# Patient Record
Sex: FEMALE | Race: WHITE | NOT HISPANIC OR LATINO | Employment: OTHER | URBAN - METROPOLITAN AREA
[De-identification: names, ages, dates, MRNs, and addresses within clinical notes are randomized per-mention and may not be internally consistent; named-entity substitution may affect disease eponyms.]

---

## 2023-04-17 DIAGNOSIS — B35.4 TINEA CORPORIS: Primary | ICD-10-CM

## 2023-04-18 RX ORDER — PANTOPRAZOLE SODIUM 40 MG/1
1 TABLET, DELAYED RELEASE ORAL DAILY
COMMUNITY
End: 2023-08-28 | Stop reason: SDUPTHER

## 2023-04-18 RX ORDER — SULFAMETHOXAZOLE AND TRIMETHOPRIM 800; 160 MG/1; MG/1
1 TABLET ORAL
COMMUNITY
Start: 2022-10-11 | End: 2024-02-29 | Stop reason: WASHOUT

## 2023-04-18 RX ORDER — KETOCONAZOLE 20 MG/G
CREAM TOPICAL
Qty: 45 G | Refills: 0 | Status: SHIPPED | OUTPATIENT
Start: 2023-04-18 | End: 2023-06-30 | Stop reason: ALTCHOICE

## 2023-04-18 RX ORDER — ATORVASTATIN CALCIUM 80 MG/1
1 TABLET, FILM COATED ORAL NIGHTLY
COMMUNITY
Start: 2019-02-13 | End: 2023-08-28 | Stop reason: SINTOL

## 2023-04-18 RX ORDER — LEVOTHYROXINE SODIUM 50 UG/1
1 TABLET ORAL DAILY
COMMUNITY
End: 2023-08-28 | Stop reason: SDUPTHER

## 2023-04-18 RX ORDER — ROPINIROLE 0.5 MG/1
TABLET, FILM COATED ORAL
COMMUNITY
Start: 2015-09-10 | End: 2023-08-28 | Stop reason: SDUPTHER

## 2023-04-18 RX ORDER — SPIRONOLACTONE 50 MG/1
TABLET, FILM COATED ORAL
COMMUNITY
Start: 2022-06-14 | End: 2024-02-29 | Stop reason: WASHOUT

## 2023-04-18 RX ORDER — LYSINE HCL 500 MG
1 TABLET ORAL DAILY
COMMUNITY

## 2023-04-18 RX ORDER — CARVEDILOL 12.5 MG/1
TABLET ORAL 2 TIMES DAILY
COMMUNITY
Start: 2015-09-10 | End: 2023-08-28 | Stop reason: SDUPTHER

## 2023-04-18 RX ORDER — ASPIRIN 81 MG/1
1 TABLET ORAL DAILY
COMMUNITY
End: 2023-08-28 | Stop reason: SDUPTHER

## 2023-04-18 RX ORDER — VIT C/E/ZN/COPPR/LUTEIN/ZEAXAN 250MG-90MG
1 CAPSULE ORAL DAILY
COMMUNITY
Start: 2021-04-21

## 2023-06-30 ENCOUNTER — OFFICE VISIT (OUTPATIENT)
Dept: PRIMARY CARE | Facility: CLINIC | Age: 83
End: 2023-06-30
Payer: MEDICARE

## 2023-06-30 VITALS
HEIGHT: 62 IN | BODY MASS INDEX: 31.65 KG/M2 | TEMPERATURE: 97 F | OXYGEN SATURATION: 97 % | WEIGHT: 172 LBS | HEART RATE: 60 BPM | DIASTOLIC BLOOD PRESSURE: 72 MMHG | RESPIRATION RATE: 16 BRPM | SYSTOLIC BLOOD PRESSURE: 111 MMHG

## 2023-06-30 DIAGNOSIS — M12.811 ROTATOR CUFF TEAR ARTHROPATHY OF RIGHT SHOULDER: ICD-10-CM

## 2023-06-30 DIAGNOSIS — E78.2 MIXED HYPERLIPIDEMIA: ICD-10-CM

## 2023-06-30 DIAGNOSIS — M75.21 BICEPS TENDONITIS ON RIGHT: ICD-10-CM

## 2023-06-30 DIAGNOSIS — E03.9 ACQUIRED HYPOTHYROIDISM: ICD-10-CM

## 2023-06-30 DIAGNOSIS — E55.9 VITAMIN D INSUFFICIENCY: ICD-10-CM

## 2023-06-30 DIAGNOSIS — I10 ESSENTIAL HYPERTENSION: Primary | ICD-10-CM

## 2023-06-30 DIAGNOSIS — M75.101 ROTATOR CUFF TEAR ARTHROPATHY OF RIGHT SHOULDER: ICD-10-CM

## 2023-06-30 DIAGNOSIS — L81.9 PIGMENTED SKIN LESION OF UNCERTAIN BEHAVIOR OF NECK: ICD-10-CM

## 2023-06-30 PROBLEM — M19.90 OSTEOARTHROSIS: Status: ACTIVE | Noted: 2023-06-30

## 2023-06-30 PROBLEM — L30.9 DERMATITIS: Status: ACTIVE | Noted: 2023-06-30

## 2023-06-30 PROBLEM — R07.81 RIB PAIN ON RIGHT SIDE: Status: ACTIVE | Noted: 2023-06-30

## 2023-06-30 PROBLEM — J06.9 ACUTE URI: Status: ACTIVE | Noted: 2023-06-30

## 2023-06-30 PROBLEM — M25.552 ACUTE PAIN OF BOTH HIPS: Status: ACTIVE | Noted: 2023-06-30

## 2023-06-30 PROBLEM — Q23.81 BICUSPID AORTIC VALVE: Status: ACTIVE | Noted: 2023-06-30

## 2023-06-30 PROBLEM — Z95.2 PRESENCE OF PROSTHETIC HEART VALVE: Status: ACTIVE | Noted: 2023-06-30

## 2023-06-30 PROBLEM — I71.00 AORTIC DISSECTION (MULTI): Status: ACTIVE | Noted: 2023-06-30

## 2023-06-30 PROBLEM — M85.80 OSTEOPENIA: Status: ACTIVE | Noted: 2023-06-30

## 2023-06-30 PROBLEM — M54.2 NECK PAIN: Status: ACTIVE | Noted: 2023-06-30

## 2023-06-30 PROBLEM — E78.5 HYPERLIPIDEMIA: Status: ACTIVE | Noted: 2023-06-30

## 2023-06-30 PROBLEM — I71.22: Status: ACTIVE | Noted: 2023-06-30

## 2023-06-30 PROBLEM — M48.02 SPINAL STENOSIS OF CERVICAL REGION: Status: ACTIVE | Noted: 2023-06-30

## 2023-06-30 PROBLEM — J20.9 ACUTE BRONCHITIS: Status: ACTIVE | Noted: 2023-06-30

## 2023-06-30 PROBLEM — M54.50 LOW BACK PAIN, EPISODIC: Status: ACTIVE | Noted: 2023-06-30

## 2023-06-30 PROBLEM — J01.00 MAXILLARY SINUSITIS, ACUTE: Status: ACTIVE | Noted: 2023-06-30

## 2023-06-30 PROBLEM — R30.0 DYSURIA: Status: ACTIVE | Noted: 2023-06-30

## 2023-06-30 PROBLEM — Q23.1 BICUSPID AORTIC VALVE (HHS-HCC): Status: ACTIVE | Noted: 2023-06-30

## 2023-06-30 PROBLEM — M25.551 ACUTE PAIN OF BOTH HIPS: Status: ACTIVE | Noted: 2023-06-30

## 2023-06-30 PROBLEM — I49.9 IRREGULAR HEART RHYTHM: Status: ACTIVE | Noted: 2023-06-30

## 2023-06-30 PROBLEM — K63.5 COLON POLYPS: Status: ACTIVE | Noted: 2023-06-30

## 2023-06-30 PROBLEM — I48.0 PAROXYSMAL ATRIAL FIBRILLATION (MULTI): Status: ACTIVE | Noted: 2023-06-30

## 2023-06-30 PROCEDURE — 1036F TOBACCO NON-USER: CPT | Performed by: INTERNAL MEDICINE

## 2023-06-30 PROCEDURE — 1159F MED LIST DOCD IN RCRD: CPT | Performed by: INTERNAL MEDICINE

## 2023-06-30 PROCEDURE — 3078F DIAST BP <80 MM HG: CPT | Performed by: INTERNAL MEDICINE

## 2023-06-30 PROCEDURE — 3074F SYST BP LT 130 MM HG: CPT | Performed by: INTERNAL MEDICINE

## 2023-06-30 PROCEDURE — 99214 OFFICE O/P EST MOD 30 MIN: CPT | Performed by: INTERNAL MEDICINE

## 2023-06-30 RX ORDER — METHYLPREDNISOLONE 4 MG/1
TABLET ORAL
Qty: 21 TABLET | Refills: 0 | Status: SHIPPED | OUTPATIENT
Start: 2023-06-30 | End: 2023-07-07

## 2023-06-30 SDOH — ECONOMIC STABILITY: FOOD INSECURITY: WITHIN THE PAST 12 MONTHS, THE FOOD YOU BOUGHT JUST DIDN'T LAST AND YOU DIDN'T HAVE MONEY TO GET MORE.: NEVER TRUE

## 2023-06-30 SDOH — ECONOMIC STABILITY: FOOD INSECURITY: WITHIN THE PAST 12 MONTHS, YOU WORRIED THAT YOUR FOOD WOULD RUN OUT BEFORE YOU GOT MONEY TO BUY MORE.: NEVER TRUE

## 2023-06-30 ASSESSMENT — ENCOUNTER SYMPTOMS
OCCASIONAL FEELINGS OF UNSTEADINESS: 1
DEPRESSION: 0
LOSS OF SENSATION IN FEET: 0

## 2023-06-30 ASSESSMENT — LIFESTYLE VARIABLES
HOW OFTEN DO YOU HAVE SIX OR MORE DRINKS ON ONE OCCASION: NEVER
HOW OFTEN DO YOU HAVE A DRINK CONTAINING ALCOHOL: MONTHLY OR LESS
AUDIT-C TOTAL SCORE: 1
HOW MANY STANDARD DRINKS CONTAINING ALCOHOL DO YOU HAVE ON A TYPICAL DAY: 1 OR 2
SKIP TO QUESTIONS 9-10: 1

## 2023-06-30 ASSESSMENT — PATIENT HEALTH QUESTIONNAIRE - PHQ9
SUM OF ALL RESPONSES TO PHQ9 QUESTIONS 1 & 2: 0
2. FEELING DOWN, DEPRESSED OR HOPELESS: NOT AT ALL
1. LITTLE INTEREST OR PLEASURE IN DOING THINGS: NOT AT ALL

## 2023-06-30 NOTE — ASSESSMENT & PLAN NOTE
Patient likely has right bicep tendonitis, will avoid use of NSAIDS given cardiac history, trial of Medrol dose pack to see if this alleviates the acute symptoms, will check an x ray to see if the bicep is torn, hopefully this will be seen on x ray

## 2023-06-30 NOTE — PROGRESS NOTES
"Chief Complaint/HPI:      Follow up    paroxysmal atrial fib, history of bioprosthetic valve, history of aortic dissection: Patient is post repair, she still follows up with Dr Monterroso at [facility], feels well now, patient has lost some weight also     Right arm pain: patient c/o right arm pain for about a week, patient states that the pain is \"terrible\", rates the pain 6/10 on pain scale now, patient needs to use the left hand to  any heavy items, patient did a great deal of crocheting prior to noting the pain, she only uses acetaminophen for pain issue     osteopenia: patient sees Dr Vincent for routine follow up, last DEXA scan completed in 1/2021     colon polyps: patient had colonoscopy per Dr Diaz at [facility], no polyps noted, she requires no follow up exams     restless legs: patient takes ropinirole        hyperlipidemia. Current treatment includes atorvastatin 80 mg    essential hypertension. The patient states she has been doing well with her blood pressure control since the last visit: takes carvedilol.     hypothyroidism of undetermined etiology : takes Synthroid 50 mcg daily .     ROS otherwise negative aside from what was mentioned above in HPI.      Patient Active Problem List   Diagnosis    Aneurysm of aortic arch (CMS/HCC)    Acute URI    Acute pain of both hips    Acute bronchitis    Aortic dissection (CMS/HCC)    Bicuspid aortic valve    Colon polyps    Dermatitis    Dysuria    Essential hypertension    Maxillary sinusitis, acute    Low back pain, episodic    Irregular heart rhythm    Hypothyroidism    Osteoarthrosis    Neck pain    Mixed hyperlipidemia    Hyperlipidemia    Paroxysmal atrial fibrillation (CMS/HCC)    Osteopenia    Presence of prosthetic heart valve    Rib pain on right side    Spinal stenosis of cervical region    Vitamin D insufficiency    Biceps tendonitis on right    Pigmented skin lesion of uncertain behavior of neck         Past Medical History:   Diagnosis " Date    Benign neoplasm of cranial nerves (CMS/HCC)     Vestibular schwannoma    Diverticulitis of intestine, part unspecified, without perforation or abscess without bleeding     Diverticulitis    Diverticulosis of intestine, part unspecified, without perforation or abscess without bleeding     Diverticulosis    Epidermolysis bullosa, 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    Epidermolysis bullosa    Personal history of other diseases of the digestive system     History of esophageal reflux    Personal history of other diseases of the digestive system 11/05/2019    History of gastroesophageal reflux (GERD)    Personal history of other diseases of the musculoskeletal system and connective tissue     History of arthritis    Personal history of other diseases of the musculoskeletal system and connective tissue 11/06/2019    History of muscle spasm    Personal history of other diseases of the nervous system and sense organs 11/05/2019    History of restless legs syndrome    Personal history of other diseases of the nervous system and sense organs 03/03/2022    History of Guillain-Kildare syndrome    Personal history of other drug therapy 02/02/2020    History of influenza vaccination    Personal history of other specified conditions 11/20/2019    History of nasal congestion    Presence of xenogenic heart valve 08/25/2022    History of aortic valve replacement with bioprosthetic valve    Restless legs syndrome     Restless leg syndrome     Past Surgical History:   Procedure Laterality Date    AORTIC VALVE REPLACEMENT  08/04/2016    Aortic Valve Replacement    CATARACT EXTRACTION  02/15/2017    Cataract Surgery    CT ABDOMEN PELVIS ANGIOGRAM W AND/OR WO IV CONTRAST  10/5/2015    CT ABDOMEN PELVIS ANGIOGRAM W AND/OR WO IV CONTRAST 10/5/2015 [facility] [place] ANCILLARY LEGACY    HAND SURGERY  08/04/2016    Hand Surgery                                                                                                                                            "               HERNIA REPAIR  08/04/2016    Hernia Repair    HYSTERECTOMY  08/04/2016    Hysterectomy    IR CVC TUNNELED  9/3/2015    IR CVC TUNNELED 9/3/2015 [facility] CLINICAL LEGACY    OTHER SURGICAL HISTORY  08/04/2016    Asc Aortic Aneurysm Graft, Coronary Reconst, Root Replace     Social History     Social History Narrative    Not on file         ALLERGIES  Amoxicillin      MEDICATIONS  Current Outpatient Medications on File Prior to Visit   Medication Sig Dispense Refill    aspirin 81 mg EC tablet Take 1 tablet (81 mg) by mouth once daily.      atorvastatin (Lipitor) 80 mg tablet Take 1 tablet (80 mg) by mouth once daily at bedtime.      calcium carbonate-vit D3-min 600 mg calcium- 400 unit tablet Take 1 tablet by mouth once daily.      carvedilol (Coreg) 12.5 mg tablet Take by mouth twice a day.      cholecalciferol (Vitamin D-3) 25 MCG (1000 UT) capsule Take 1 capsule (25 mcg) by mouth once daily.      levothyroxine (Synthroid, Levoxyl) 50 mcg tablet Take 1 tablet (50 mcg) by mouth once daily.      pantoprazole (ProtoNix) 40 mg EC tablet Take 1 tablet (40 mg) by mouth once daily.      rOPINIRole (Requip) 0.5 mg tablet Take by mouth.      spironolactone (Aldactone) 50 mg tablet Take by mouth.      sulfamethoxazole-trimethoprim (Bactrim DS) 800-160 mg tablet Take 1 tablet by mouth 2 times a day.      [DISCONTINUED] ketoconazole (NIZOral) 2 % cream Apply to affected area once daily as needed (Patient not taking: Reported on 6/30/2023) 45 g 0     No current facility-administered medications on file prior to visit.         PHYSICAL EXAM  /72 (BP Location: Left arm, Patient Position: Sitting, BP Cuff Size: Large adult)   Pulse 60   Temp 36.1 °C (97 °F)   Resp 16   Ht 1.575 m (5' 2\")   Wt 78 kg (172 lb)   SpO2 97%   BMI 31.46 kg/m²   Body mass index is 31.46 kg/m².  Constitutional   General appearance:  well developed, well nourished, overweight  Eyes   Inspection of eyes: Sclera and conjunctiva were " normal.   Ears, Nose, Mouth, and Throat   Ears: Auricles: Normal. No carotid bruits, no thyromegaly or masses  Pulmonary   Respiratory assessment: No respiratory distress, normal respiratory rhythm and effort.    Auscultation of Lungs: Clear bilateral breath sounds.   Cardiovascular   Auscultation of heart:  The heart rate was normal. The rhythm was regular. Heart sounds: a click was heard, but normal S1 and normal S2. Prosthetic valve: prosthetic aortic valve heard. A grade 2 systolic murmur was heard at the LUSB. This has not changed, bioprosthetic valve, valvular click is noted.    Exam for edema: No peripheral edema.   Lymphatic   Palpation of lymph nodes in neck: No cervical lymphadenopathy.   Neurologic   Cranial nerves: Nerves 2-12 were intact, no focal neuro defects  MSK  Tender over right bicep tendon, just proximal to the right elbow. No abnormal contraction of bicep is appreciated, the right elbow is not tender     Psychiatric   Orientation: Oriented to person, place, and time.    Mood and affect: Normal.     ASSESSMENT/PLAN  Problem List Items Addressed This Visit       Essential hypertension - Primary    Current Assessment & Plan     BP is well controlled, no med changes needed         Hypothyroidism    Current Assessment & Plan     Stable, no med changes, recheck labs as ordered         Mixed hyperlipidemia    Current Assessment & Plan     Stable labs ,  no med changes, recheck labs as ordered         Vitamin D insufficiency    Biceps tendonitis on right    Current Assessment & Plan     Patient likely has right bicep tendonitis, will avoid use of NSAIDS given cardiac history, trial of Medrol dose pack to see if this alleviates the acute symptoms, will check an x ray to see if the bicep is torn, hopefully this will be seen on x ray         Relevant Medications    methylPREDNISolone (Medrol Dospak) 4 mg tablets    Other Relevant Orders    XR humerus right    Pigmented skin lesion of uncertain behavior of  neck    Relevant Orders    Referral to Dermatology   Check labs as ordered  Follow up as scheduled.          Miguel Angel Bal MD

## 2023-07-05 ENCOUNTER — TELEPHONE (OUTPATIENT)
Dept: PRIMARY CARE | Facility: CLINIC | Age: 83
End: 2023-07-05
Payer: MEDICARE

## 2023-07-05 DIAGNOSIS — M12.811 ROTATOR CUFF TEAR ARTHROPATHY OF RIGHT SHOULDER: Primary | ICD-10-CM

## 2023-07-05 DIAGNOSIS — M75.101 ROTATOR CUFF TEAR ARTHROPATHY OF RIGHT SHOULDER: Primary | ICD-10-CM

## 2023-07-06 PROBLEM — M12.811 ROTATOR CUFF TEAR ARTHROPATHY OF RIGHT SHOULDER: Status: ACTIVE | Noted: 2023-07-06

## 2023-07-06 PROBLEM — M75.101 ROTATOR CUFF TEAR ARTHROPATHY OF RIGHT SHOULDER: Status: ACTIVE | Noted: 2023-07-06

## 2023-07-06 NOTE — TELEPHONE ENCOUNTER
Pt called and needs to know what the results are of her xray. She isn't able to use her arm/hand, and she needs to know what's going on.

## 2023-07-13 NOTE — TELEPHONE ENCOUNTER
Gave pt results and she stated she is not having any more pain with it.  She is icing it twice a day and it got better.  Will discuss at future visit.

## 2023-07-24 NOTE — TELEPHONE ENCOUNTER
Pt stopped by the office wanting to know the result of her xray.  She remembers talking to Roslyn Lopez MA.  She says she is in pain and is able to lift her arm above her head and doesn't think it is with her rotator cuff.  She is wondering if she should have a CT or some other X-Ray.  I told her that Dr Bal would like to refer her to Ortho but she said she would like a more definite reason on why she should go there.

## 2023-07-24 NOTE — TELEPHONE ENCOUNTER
Pt called back in and would like another call about this. Patient is confused and does not remember what the results were. Patient notes that she is not home this morning but can take a call any other time.

## 2023-07-31 ENCOUNTER — TELEPHONE (OUTPATIENT)
Dept: PRIMARY CARE | Facility: CLINIC | Age: 83
End: 2023-07-31
Payer: MEDICARE

## 2023-08-03 NOTE — TELEPHONE ENCOUNTER
I see your review of her recent xray results but do not see anything in the system for her MRI. Please take a look when you have a chance and I will let her know.

## 2023-08-10 ENCOUNTER — TELEPHONE (OUTPATIENT)
Dept: PRIMARY CARE | Facility: CLINIC | Age: 83
End: 2023-08-10
Payer: MEDICARE

## 2023-08-10 DIAGNOSIS — M85.80 OSTEOPENIA, UNSPECIFIED LOCATION: ICD-10-CM

## 2023-08-10 DIAGNOSIS — I48.0 PAROXYSMAL ATRIAL FIBRILLATION (MULTI): ICD-10-CM

## 2023-08-10 DIAGNOSIS — E03.9 ACQUIRED HYPOTHYROIDISM: ICD-10-CM

## 2023-08-10 DIAGNOSIS — E78.2 MIXED HYPERLIPIDEMIA: Primary | ICD-10-CM

## 2023-08-10 DIAGNOSIS — E55.9 VITAMIN D INSUFFICIENCY: ICD-10-CM

## 2023-08-10 DIAGNOSIS — I10 ESSENTIAL HYPERTENSION: ICD-10-CM

## 2023-08-25 ENCOUNTER — LAB (OUTPATIENT)
Dept: LAB | Facility: LAB | Age: 83
End: 2023-08-25
Payer: MEDICARE

## 2023-08-25 DIAGNOSIS — E55.9 VITAMIN D INSUFFICIENCY: ICD-10-CM

## 2023-08-25 DIAGNOSIS — E03.9 ACQUIRED HYPOTHYROIDISM: ICD-10-CM

## 2023-08-25 DIAGNOSIS — I48.0 PAROXYSMAL ATRIAL FIBRILLATION (MULTI): ICD-10-CM

## 2023-08-25 DIAGNOSIS — I10 ESSENTIAL HYPERTENSION: ICD-10-CM

## 2023-08-25 DIAGNOSIS — E78.2 MIXED HYPERLIPIDEMIA: ICD-10-CM

## 2023-08-25 DIAGNOSIS — M85.80 OSTEOPENIA, UNSPECIFIED LOCATION: ICD-10-CM

## 2023-08-25 LAB
ALANINE AMINOTRANSFERASE (SGPT) (U/L) IN SER/PLAS: 13 U/L (ref 7–45)
ALBUMIN (G/DL) IN SER/PLAS: 4.2 G/DL (ref 3.4–5)
ALBUMIN (MG/L) IN URINE: <7 MG/L
ALBUMIN/CREATININE (UG/MG) IN URINE: NORMAL UG/MG CRT (ref 0–30)
ALKALINE PHOSPHATASE (U/L) IN SER/PLAS: 51 U/L (ref 33–136)
ANION GAP IN SER/PLAS: 12 MMOL/L (ref 10–20)
ASPARTATE AMINOTRANSFERASE (SGOT) (U/L) IN SER/PLAS: 19 U/L (ref 9–39)
BASOPHILS (10*3/UL) IN BLOOD BY AUTOMATED COUNT: 0.08 X10E9/L (ref 0–0.1)
BASOPHILS/100 LEUKOCYTES IN BLOOD BY AUTOMATED COUNT: 1.2 % (ref 0–2)
BILIRUBIN TOTAL (MG/DL) IN SER/PLAS: 0.8 MG/DL (ref 0–1.2)
CALCIDIOL (25 OH VITAMIN D3) (NG/ML) IN SER/PLAS: 28 NG/ML
CALCIUM (MG/DL) IN SER/PLAS: 9.1 MG/DL (ref 8.6–10.3)
CARBON DIOXIDE, TOTAL (MMOL/L) IN SER/PLAS: 24 MMOL/L (ref 21–32)
CHLORIDE (MMOL/L) IN SER/PLAS: 108 MMOL/L (ref 98–107)
CHOLESTEROL (MG/DL) IN SER/PLAS: 191 MG/DL (ref 0–199)
CHOLESTEROL IN HDL (MG/DL) IN SER/PLAS: 74.4 MG/DL
CHOLESTEROL/HDL RATIO: 2.6
CREATININE (MG/DL) IN SER/PLAS: 1.04 MG/DL (ref 0.5–1.05)
CREATININE (MG/DL) IN URINE: 99.5 MG/DL (ref 20–320)
EOSINOPHILS (10*3/UL) IN BLOOD BY AUTOMATED COUNT: 0.26 X10E9/L (ref 0–0.4)
EOSINOPHILS/100 LEUKOCYTES IN BLOOD BY AUTOMATED COUNT: 4.1 % (ref 0–6)
ERYTHROCYTE DISTRIBUTION WIDTH (RATIO) BY AUTOMATED COUNT: 14.2 % (ref 11.5–14.5)
ERYTHROCYTE MEAN CORPUSCULAR HEMOGLOBIN CONCENTRATION (G/DL) BY AUTOMATED: 32 G/DL (ref 32–36)
ERYTHROCYTE MEAN CORPUSCULAR VOLUME (FL) BY AUTOMATED COUNT: 103 FL (ref 80–100)
ERYTHROCYTES (10*6/UL) IN BLOOD BY AUTOMATED COUNT: 3.89 X10E12/L (ref 4–5.2)
GFR FEMALE: 53 ML/MIN/1.73M2
GLUCOSE (MG/DL) IN SER/PLAS: 114 MG/DL (ref 74–99)
HEMATOCRIT (%) IN BLOOD BY AUTOMATED COUNT: 40 % (ref 36–46)
HEMOGLOBIN (G/DL) IN BLOOD: 12.8 G/DL (ref 12–16)
IMMATURE GRANULOCYTES/100 LEUKOCYTES IN BLOOD BY AUTOMATED COUNT: 0.3 % (ref 0–0.9)
LDL: 94 MG/DL (ref 0–99)
LEUKOCYTES (10*3/UL) IN BLOOD BY AUTOMATED COUNT: 6.4 X10E9/L (ref 4.4–11.3)
LYMPHOCYTES (10*3/UL) IN BLOOD BY AUTOMATED COUNT: 1.62 X10E9/L (ref 0.8–3)
LYMPHOCYTES/100 LEUKOCYTES IN BLOOD BY AUTOMATED COUNT: 25.3 % (ref 13–44)
MONOCYTES (10*3/UL) IN BLOOD BY AUTOMATED COUNT: 0.77 X10E9/L (ref 0.05–0.8)
MONOCYTES/100 LEUKOCYTES IN BLOOD BY AUTOMATED COUNT: 12 % (ref 2–10)
NEUTROPHILS (10*3/UL) IN BLOOD BY AUTOMATED COUNT: 3.66 X10E9/L (ref 1.6–5.5)
NEUTROPHILS/100 LEUKOCYTES IN BLOOD BY AUTOMATED COUNT: 57.1 % (ref 40–80)
PLATELETS (10*3/UL) IN BLOOD AUTOMATED COUNT: 181 X10E9/L (ref 150–450)
POTASSIUM (MMOL/L) IN SER/PLAS: 4.6 MMOL/L (ref 3.5–5.3)
PROTEIN TOTAL: 6.4 G/DL (ref 6.4–8.2)
SODIUM (MMOL/L) IN SER/PLAS: 139 MMOL/L (ref 136–145)
TRIGLYCERIDE (MG/DL) IN SER/PLAS: 114 MG/DL (ref 0–149)
UREA NITROGEN (MG/DL) IN SER/PLAS: 19 MG/DL (ref 6–23)
VLDL: 23 MG/DL (ref 0–40)

## 2023-08-25 PROCEDURE — 85025 COMPLETE CBC W/AUTO DIFF WBC: CPT

## 2023-08-25 PROCEDURE — 80053 COMPREHEN METABOLIC PANEL: CPT

## 2023-08-25 PROCEDURE — 82570 ASSAY OF URINE CREATININE: CPT

## 2023-08-25 PROCEDURE — 82306 VITAMIN D 25 HYDROXY: CPT

## 2023-08-25 PROCEDURE — 36415 COLL VENOUS BLD VENIPUNCTURE: CPT

## 2023-08-25 PROCEDURE — 82043 UR ALBUMIN QUANTITATIVE: CPT

## 2023-08-25 PROCEDURE — 80061 LIPID PANEL: CPT

## 2023-08-28 ENCOUNTER — OFFICE VISIT (OUTPATIENT)
Dept: PRIMARY CARE | Facility: CLINIC | Age: 83
End: 2023-08-28
Payer: MEDICARE

## 2023-08-28 VITALS
HEIGHT: 63 IN | HEART RATE: 81 BPM | DIASTOLIC BLOOD PRESSURE: 62 MMHG | OXYGEN SATURATION: 95 % | RESPIRATION RATE: 16 BRPM | WEIGHT: 173 LBS | BODY MASS INDEX: 30.65 KG/M2 | SYSTOLIC BLOOD PRESSURE: 97 MMHG | TEMPERATURE: 97.4 F

## 2023-08-28 DIAGNOSIS — I10 ESSENTIAL HYPERTENSION: ICD-10-CM

## 2023-08-28 DIAGNOSIS — G25.81 RESTLESS LEG SYNDROME: ICD-10-CM

## 2023-08-28 DIAGNOSIS — Z95.2 PRESENCE OF PROSTHETIC HEART VALVE: ICD-10-CM

## 2023-08-28 DIAGNOSIS — E78.2 MIXED HYPERLIPIDEMIA: ICD-10-CM

## 2023-08-28 DIAGNOSIS — E55.9 VITAMIN D INSUFFICIENCY: ICD-10-CM

## 2023-08-28 DIAGNOSIS — E03.9 ACQUIRED HYPOTHYROIDISM: ICD-10-CM

## 2023-08-28 DIAGNOSIS — M54.2 NECK PAIN: ICD-10-CM

## 2023-08-28 DIAGNOSIS — E66.9 OBESITY (BMI 30.0-34.9): ICD-10-CM

## 2023-08-28 DIAGNOSIS — K21.9 GASTROESOPHAGEAL REFLUX DISEASE, UNSPECIFIED WHETHER ESOPHAGITIS PRESENT: Primary | ICD-10-CM

## 2023-08-28 PROBLEM — Z95.4: Status: ACTIVE | Noted: 2023-08-28

## 2023-08-28 PROBLEM — E66.811 OBESITY (BMI 30.0-34.9): Status: ACTIVE | Noted: 2021-07-01

## 2023-08-28 PROCEDURE — 1036F TOBACCO NON-USER: CPT | Performed by: INTERNAL MEDICINE

## 2023-08-28 PROCEDURE — 99214 OFFICE O/P EST MOD 30 MIN: CPT | Performed by: INTERNAL MEDICINE

## 2023-08-28 PROCEDURE — 3078F DIAST BP <80 MM HG: CPT | Performed by: INTERNAL MEDICINE

## 2023-08-28 PROCEDURE — 1159F MED LIST DOCD IN RCRD: CPT | Performed by: INTERNAL MEDICINE

## 2023-08-28 PROCEDURE — 3074F SYST BP LT 130 MM HG: CPT | Performed by: INTERNAL MEDICINE

## 2023-08-28 RX ORDER — ATORVASTATIN CALCIUM 80 MG/1
80 TABLET, FILM COATED ORAL NIGHTLY
Qty: 90 TABLET | Refills: 1 | Status: CANCELLED | OUTPATIENT
Start: 2023-08-28

## 2023-08-28 RX ORDER — CARVEDILOL 12.5 MG/1
12.5 TABLET ORAL
Qty: 180 TABLET | Refills: 3 | Status: SHIPPED | OUTPATIENT
Start: 2023-08-28 | End: 2024-02-29 | Stop reason: SDUPTHER

## 2023-08-28 RX ORDER — ROSUVASTATIN CALCIUM 40 MG/1
40 TABLET, COATED ORAL DAILY
Qty: 90 TABLET | Refills: 3 | Status: SHIPPED | OUTPATIENT
Start: 2023-08-28 | End: 2024-02-29 | Stop reason: SDUPTHER

## 2023-08-28 RX ORDER — LEVOTHYROXINE SODIUM 50 UG/1
50 TABLET ORAL DAILY
Qty: 90 TABLET | Refills: 1 | Status: SHIPPED | OUTPATIENT
Start: 2023-08-28 | End: 2024-02-29 | Stop reason: SDUPTHER

## 2023-08-28 RX ORDER — ROPINIROLE 0.5 MG/1
1.5 TABLET, FILM COATED ORAL NIGHTLY
Qty: 270 TABLET | Refills: 3 | Status: SHIPPED | OUTPATIENT
Start: 2023-08-28 | End: 2024-02-29 | Stop reason: SDUPTHER

## 2023-08-28 RX ORDER — PANTOPRAZOLE SODIUM 40 MG/1
40 TABLET, DELAYED RELEASE ORAL DAILY
Qty: 90 TABLET | Refills: 1 | Status: SHIPPED | OUTPATIENT
Start: 2023-08-28 | End: 2024-02-29 | Stop reason: SDUPTHER

## 2023-08-28 RX ORDER — ASPIRIN 81 MG/1
81 TABLET ORAL DAILY
Qty: 90 TABLET | Refills: 1 | Status: SHIPPED | OUTPATIENT
Start: 2023-08-28 | End: 2024-02-29 | Stop reason: SDUPTHER

## 2023-08-28 NOTE — ASSESSMENT & PLAN NOTE
Patient takes atorvastatin 80 mg  daily, will try rosuvastatin to see if this reduces muscle aching, stop the atorvastatin, recheck labs in 6 months

## 2023-08-28 NOTE — PROGRESS NOTES
Chief Complaint/HPI:    paroxysmal atrial fib, history of bioprosthetic valve, history of aortic dissection: Patient is post repair, she still follows up with Dr Monterroso at [facility], feels well now     Right arm pain: pain has resolved, patient stopped sewing, symptoms are gone now     osteopenia: patient sees Dr Vincent for routine follow up, last DEXA scan completed in 1/2021,  it is due this year     colon polyps: patient had colonoscopy per Dr Diaz at [facility], no polyps noted, she requires no follow up exams     restless legs: patient takes ropinirole    hyperlipidemia. Current treatment includes atorvastatin 80 mg, patient gets muscle aching, she feels that this may due to atorvastatin     essential hypertension. The patient states she has been doing well with her blood pressure control since the last visit: takes carvedilol. She gets lightheaded at times when sitting up quickly    Vitamin d deficiency: takes calcium and vitamin d daily     hypothyroidism of undetermined etiology : takes Synthroid 50 mcg daily .     ROS otherwise negative aside from what was mentioned above in HPI.      Patient Active Problem List   Diagnosis    Aneurysm of aortic arch (CMS/HCC)    Acute URI    Acute pain of both hips    Acute bronchitis    Aortic dissection (CMS/HCC)    Bicuspid aortic valve    Colon polyps    Dermatitis    Dysuria    Essential hypertension    Maxillary sinusitis, acute    Low back pain, episodic    Irregular heart rhythm    Hypothyroidism    Osteoarthrosis    Neck pain    Mixed hyperlipidemia    Hyperlipidemia    Paroxysmal atrial fibrillation (CMS/HCC)    Osteopenia    Presence of prosthetic heart valve    Rib pain on right side    Spinal stenosis of cervical region    Vitamin D insufficiency    Biceps tendonitis on right    Pigmented skin lesion of uncertain behavior of neck    Rotator cuff tear arthropathy of right shoulder    Diarrhea    H/O stentless aortic valve replacement    Obesity (BMI  30.0-34.9)    Gastroesophageal reflux disease         Past Medical History:   Diagnosis Date    Benign neoplasm of cranial nerves (CMS/HCC)     Vestibular schwannoma    Diverticulitis of intestine, part unspecified, without perforation or abscess without bleeding     Diverticulitis    Diverticulosis of intestine, part unspecified, without perforation or abscess without bleeding     Diverticulosis    Epidermolysis bullosa, 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    Epidermolysis bullosa    Personal history of other diseases of the digestive system     History of esophageal reflux    Personal history of other diseases of the digestive system 11/05/2019    History of gastroesophageal reflux (GERD)    Personal history of other diseases of the musculoskeletal system and connective tissue     History of arthritis    Personal history of other diseases of the musculoskeletal system and connective tissue 11/06/2019    History of muscle spasm    Personal history of other diseases of the nervous system and sense organs 11/05/2019    History of restless legs syndrome    Personal history of other diseases of the nervous system and sense organs 03/03/2022    History of Guillain-Kennard syndrome    Personal history of other drug therapy 02/02/2020    History of influenza vaccination    Personal history of other specified conditions 11/20/2019    History of nasal congestion    Presence of xenogenic heart valve 08/25/2022    History of aortic valve replacement with bioprosthetic valve    Restless legs syndrome     Restless leg syndrome     Past Surgical History:   Procedure Laterality Date    AORTIC VALVE REPLACEMENT  08/04/2016    Aortic Valve Replacement    CATARACT EXTRACTION  02/15/2017    Cataract Surgery    CT ABDOMEN PELVIS ANGIOGRAM W AND/OR WO IV CONTRAST  10/5/2015    CT ABDOMEN PELVIS ANGIOGRAM W AND/OR WO IV CONTRAST 10/5/2015 [facility] [place] ANCILLARY LEGACY    HAND SURGERY  08/04/2016    Hand Surgery                                                    "                                                                                                       HERNIA REPAIR  08/04/2016    Hernia Repair    HYSTERECTOMY  08/04/2016    Hysterectomy    IR CVC TUNNELED  9/3/2015    IR CVC TUNNELED 9/3/2015 [facility] CLINICAL LEGACY    OTHER SURGICAL HISTORY  08/04/2016    Asc Aortic Aneurysm Graft, Coronary Reconst, Root Replace     Social History     Social History Narrative    Not on file         ALLERGIES  Amoxicillin      MEDICATIONS  Current Outpatient Medications on File Prior to Visit   Medication Sig Dispense Refill    [DISCONTINUED] aspirin 81 mg EC tablet Take 1 tablet (81 mg) by mouth once daily.      [DISCONTINUED] atorvastatin (Lipitor) 80 mg tablet Take 1 tablet (80 mg) by mouth once daily at bedtime.      [DISCONTINUED] carvedilol (Coreg) 12.5 mg tablet Take by mouth twice a day.      [DISCONTINUED] levothyroxine (Synthroid, Levoxyl) 50 mcg tablet Take 1 tablet (50 mcg) by mouth once daily.      [DISCONTINUED] pantoprazole (ProtoNix) 40 mg EC tablet Take 1 tablet (40 mg) by mouth once daily.      [DISCONTINUED] rOPINIRole (Requip) 0.5 mg tablet Take by mouth.      calcium carbonate-vit D3-min 600 mg calcium- 400 unit tablet Take 1 tablet by mouth once daily.      cholecalciferol (Vitamin D-3) 25 MCG (1000 UT) capsule Take 1 capsule (25 mcg) by mouth once daily.      spironolactone (Aldactone) 50 mg tablet Take by mouth.      sulfamethoxazole-trimethoprim (Bactrim DS) 800-160 mg tablet Take 1 tablet by mouth 2 times a day.       No current facility-administered medications on file prior to visit.         PHYSICAL EXAM  BP 97/62 (BP Location: Right arm, Patient Position: Sitting, BP Cuff Size: Large adult)   Pulse 81   Temp 36.3 °C (97.4 °F)   Resp 16   Ht 1.607 m (5' 3.25\")   Wt 78.5 kg (173 lb)   SpO2 95%   BMI 30.40 kg/m²   Body mass index is 30.4 kg/m².  Constitutional   General appearance:  well developed, well nourished, overweight  Eyes   Inspection of " eyes: Sclera and conjunctiva were normal.   Ears, Nose, Mouth, and Throat   Ears: Auricles: Normal. No carotid bruits, no thyromegaly or masses  Pulmonary   Respiratory assessment: No respiratory distress, normal respiratory rhythm and effort.    Auscultation of Lungs: Clear bilateral breath sounds.   Cardiovascular   Auscultation of heart:  The heart rate was normal. The rhythm was regular. Heart sounds: a click was heard, but normal S1 and normal S2. Prosthetic valve: prosthetic aortic valve heard. A grade 2 systolic murmur was heard at the LUSB. This has not changed, bioprosthetic valve, valvular click is noted.    Exam for edema: No peripheral edema.   Lymphatic   Palpation of lymph nodes in neck: No cervical lymphadenopathy.   Neurologic   Cranial nerves: Nerves 2-12 were intact, no focal neuro defects  Psychiatric   Orientation: Oriented to person, place, and time.    Mood and affect: Normal.     ASSESSMENT/PLAN  Problem List Items Addressed This Visit       Essential hypertension    Current Assessment & Plan     BP is low, no med changes, sees cardiology         Relevant Medications    carvedilol (Coreg) 12.5 mg tablet    Hypothyroidism    Current Assessment & Plan     Controlled at present, recheck labs in 6 months         Relevant Medications    levothyroxine (Synthroid, Levoxyl) 50 mcg tablet    Neck pain    Relevant Medications    aspirin 81 mg EC tablet    Mixed hyperlipidemia    Current Assessment & Plan     Patient takes atorvastatin 80 mg  daily, will try rosuvastatin to see if this reduces muscle aching, stop the atorvastatin, recheck labs in 6 months         Relevant Medications    rosuvastatin (Crestor) 40 mg tablet    Presence of prosthetic heart valve    Current Assessment & Plan     Follow up with cardiology.         Relevant Medications    rosuvastatin (Crestor) 40 mg tablet    Vitamin D insufficiency    Current Assessment & Plan     Take vitamin d, recheck labs in 6 months         Obesity (BMI  30.0-34.9)    Gastroesophageal reflux disease - Primary    Current Assessment & Plan     Stable at present         Relevant Medications    pantoprazole (ProtoNix) 40 mg EC tablet     Other Visit Diagnoses       Restless leg syndrome        Relevant Medications    rOPINIRole (Requip) 0.5 mg tablet        Check labs in 6 months  Follow up in 6 months, trial of rosuvastatin to see if leg issues improve      Miguel Angel Bal MD

## 2024-01-04 ENCOUNTER — TELEPHONE (OUTPATIENT)
Dept: PRIMARY CARE | Facility: CLINIC | Age: 84
End: 2024-01-04
Payer: MEDICARE

## 2024-01-04 NOTE — TELEPHONE ENCOUNTER
Pt would like to know if you will refill her spironolactone (Aldactone) 50 mg tablet . I believe that her dermatologist usually fills it, but pt said you filled it for her at her [facility] on  8/28/23, but I don't see that you filled it. Please advise.

## 2024-02-19 ENCOUNTER — TELEPHONE (OUTPATIENT)
Dept: PRIMARY CARE | Facility: CLINIC | Age: 84
End: 2024-02-19
Payer: MEDICARE

## 2024-02-19 DIAGNOSIS — E78.2 MIXED HYPERLIPIDEMIA: Primary | ICD-10-CM

## 2024-02-19 DIAGNOSIS — I48.0 PAROXYSMAL ATRIAL FIBRILLATION (MULTI): ICD-10-CM

## 2024-02-19 DIAGNOSIS — E03.9 ACQUIRED HYPOTHYROIDISM: ICD-10-CM

## 2024-02-19 DIAGNOSIS — E55.9 VITAMIN D INSUFFICIENCY: ICD-10-CM

## 2024-02-19 DIAGNOSIS — I10 ESSENTIAL HYPERTENSION: ICD-10-CM

## 2024-02-20 NOTE — TELEPHONE ENCOUNTER
Patient called, notified of message, patient expressed verbal understanding had no questions at this time.  Pt wanted it noted that she can not get into her MyChart! So please call instead.

## 2024-02-21 ENCOUNTER — LAB (OUTPATIENT)
Dept: LAB | Facility: LAB | Age: 84
End: 2024-02-21
Payer: MEDICARE

## 2024-02-21 DIAGNOSIS — I10 ESSENTIAL HYPERTENSION: ICD-10-CM

## 2024-02-21 DIAGNOSIS — E03.9 ACQUIRED HYPOTHYROIDISM: ICD-10-CM

## 2024-02-21 DIAGNOSIS — E55.9 VITAMIN D INSUFFICIENCY: ICD-10-CM

## 2024-02-21 DIAGNOSIS — E78.2 MIXED HYPERLIPIDEMIA: ICD-10-CM

## 2024-02-21 DIAGNOSIS — I48.0 PAROXYSMAL ATRIAL FIBRILLATION (MULTI): ICD-10-CM

## 2024-02-21 LAB
25(OH)D3 SERPL-MCNC: 50 NG/ML (ref 30–100)
ALBUMIN SERPL BCP-MCNC: 4.4 G/DL (ref 3.4–5)
ALP SERPL-CCNC: 60 U/L (ref 33–136)
ALT SERPL W P-5'-P-CCNC: 13 U/L (ref 7–45)
ANION GAP SERPL CALC-SCNC: 12 MMOL/L (ref 10–20)
AST SERPL W P-5'-P-CCNC: 20 U/L (ref 9–39)
BASOPHILS # BLD AUTO: 0.07 X10*3/UL (ref 0–0.1)
BASOPHILS NFR BLD AUTO: 1 %
BILIRUB SERPL-MCNC: 0.7 MG/DL (ref 0–1.2)
BUN SERPL-MCNC: 19 MG/DL (ref 6–23)
CALCIUM SERPL-MCNC: 9.3 MG/DL (ref 8.6–10.3)
CHLORIDE SERPL-SCNC: 107 MMOL/L (ref 98–107)
CHOLEST SERPL-MCNC: 185 MG/DL (ref 0–199)
CHOLESTEROL/HDL RATIO: 2.1
CO2 SERPL-SCNC: 25 MMOL/L (ref 21–32)
CREAT SERPL-MCNC: 1.01 MG/DL (ref 0.5–1.05)
EGFRCR SERPLBLD CKD-EPI 2021: 55 ML/MIN/1.73M*2
EOSINOPHIL # BLD AUTO: 0.23 X10*3/UL (ref 0–0.4)
EOSINOPHIL NFR BLD AUTO: 3.3 %
ERYTHROCYTE [DISTWIDTH] IN BLOOD BY AUTOMATED COUNT: 13.5 % (ref 11.5–14.5)
GLUCOSE SERPL-MCNC: 92 MG/DL (ref 74–99)
HCT VFR BLD AUTO: 41.3 % (ref 36–46)
HDLC SERPL-MCNC: 89.4 MG/DL
HGB BLD-MCNC: 13.1 G/DL (ref 12–16)
IMM GRANULOCYTES # BLD AUTO: 0.02 X10*3/UL (ref 0–0.5)
IMM GRANULOCYTES NFR BLD AUTO: 0.3 % (ref 0–0.9)
LDLC SERPL CALC-MCNC: 76 MG/DL
LYMPHOCYTES # BLD AUTO: 2.07 X10*3/UL (ref 0.8–3)
LYMPHOCYTES NFR BLD AUTO: 29.5 %
MCH RBC QN AUTO: 32.2 PG (ref 26–34)
MCHC RBC AUTO-ENTMCNC: 31.7 G/DL (ref 32–36)
MCV RBC AUTO: 102 FL (ref 80–100)
MONOCYTES # BLD AUTO: 0.61 X10*3/UL (ref 0.05–0.8)
MONOCYTES NFR BLD AUTO: 8.7 %
NEUTROPHILS # BLD AUTO: 4.01 X10*3/UL (ref 1.6–5.5)
NEUTROPHILS NFR BLD AUTO: 57.2 %
NON HDL CHOLESTEROL: 96 MG/DL (ref 0–149)
NRBC BLD-RTO: 0 /100 WBCS (ref 0–0)
PLATELET # BLD AUTO: 164 X10*3/UL (ref 150–450)
POTASSIUM SERPL-SCNC: 4.2 MMOL/L (ref 3.5–5.3)
PROT SERPL-MCNC: 7 G/DL (ref 6.4–8.2)
RBC # BLD AUTO: 4.07 X10*6/UL (ref 4–5.2)
SODIUM SERPL-SCNC: 140 MMOL/L (ref 136–145)
TRIGL SERPL-MCNC: 97 MG/DL (ref 0–149)
TSH SERPL-ACNC: 2.5 MIU/L (ref 0.44–3.98)
VLDL: 19 MG/DL (ref 0–40)
WBC # BLD AUTO: 7 X10*3/UL (ref 4.4–11.3)

## 2024-02-21 PROCEDURE — 80061 LIPID PANEL: CPT

## 2024-02-21 PROCEDURE — 80053 COMPREHEN METABOLIC PANEL: CPT

## 2024-02-21 PROCEDURE — 85025 COMPLETE CBC W/AUTO DIFF WBC: CPT

## 2024-02-21 PROCEDURE — 84443 ASSAY THYROID STIM HORMONE: CPT

## 2024-02-21 PROCEDURE — 36415 COLL VENOUS BLD VENIPUNCTURE: CPT

## 2024-02-21 PROCEDURE — 82306 VITAMIN D 25 HYDROXY: CPT

## 2024-02-29 ENCOUNTER — OFFICE VISIT (OUTPATIENT)
Dept: PRIMARY CARE | Facility: CLINIC | Age: 84
End: 2024-02-29
Payer: MEDICARE

## 2024-02-29 VITALS
OXYGEN SATURATION: 97 % | HEART RATE: 54 BPM | TEMPERATURE: 97.2 F | SYSTOLIC BLOOD PRESSURE: 131 MMHG | BODY MASS INDEX: 31.47 KG/M2 | RESPIRATION RATE: 16 BRPM | DIASTOLIC BLOOD PRESSURE: 76 MMHG | WEIGHT: 177.6 LBS | HEIGHT: 63 IN

## 2024-02-29 DIAGNOSIS — E55.9 VITAMIN D INSUFFICIENCY: ICD-10-CM

## 2024-02-29 DIAGNOSIS — Z95.2 PRESENCE OF PROSTHETIC HEART VALVE: ICD-10-CM

## 2024-02-29 DIAGNOSIS — I71.00 DISSECTION OF AORTA, UNSPECIFIED PORTION OF AORTA (MULTI): ICD-10-CM

## 2024-02-29 DIAGNOSIS — Z00.00 ROUTINE GENERAL MEDICAL EXAMINATION AT HEALTH CARE FACILITY: Primary | ICD-10-CM

## 2024-02-29 DIAGNOSIS — G25.81 RESTLESS LEG SYNDROME: ICD-10-CM

## 2024-02-29 DIAGNOSIS — I48.0 PAROXYSMAL ATRIAL FIBRILLATION (MULTI): ICD-10-CM

## 2024-02-29 DIAGNOSIS — E03.9 ACQUIRED HYPOTHYROIDISM: ICD-10-CM

## 2024-02-29 DIAGNOSIS — K21.9 GASTROESOPHAGEAL REFLUX DISEASE, UNSPECIFIED WHETHER ESOPHAGITIS PRESENT: ICD-10-CM

## 2024-02-29 DIAGNOSIS — G25.81 RESTLESS LEGS SYNDROME: ICD-10-CM

## 2024-02-29 DIAGNOSIS — N18.31 STAGE 3A CHRONIC KIDNEY DISEASE (MULTI): ICD-10-CM

## 2024-02-29 DIAGNOSIS — I10 ESSENTIAL HYPERTENSION: ICD-10-CM

## 2024-02-29 DIAGNOSIS — E66.9 OBESITY (BMI 30.0-34.9): ICD-10-CM

## 2024-02-29 DIAGNOSIS — M54.2 NECK PAIN: ICD-10-CM

## 2024-02-29 DIAGNOSIS — D33.3 ACOUSTIC NEUROMA (MULTI): ICD-10-CM

## 2024-02-29 DIAGNOSIS — E78.2 MIXED HYPERLIPIDEMIA: ICD-10-CM

## 2024-02-29 PROBLEM — K57.30 DIVERTICULA OF INTESTINE: Status: ACTIVE | Noted: 2024-02-29

## 2024-02-29 PROBLEM — M81.0 POST-MENOPAUSAL OSTEOPOROSIS: Status: ACTIVE | Noted: 2024-02-29

## 2024-02-29 PROBLEM — L65.9 LOSS OF HAIR: Status: ACTIVE | Noted: 2024-02-29

## 2024-02-29 PROBLEM — R25.2 SPASM: Status: ACTIVE | Noted: 2024-02-29

## 2024-02-29 PROBLEM — R09.81 NASAL CONGESTION: Status: ACTIVE | Noted: 2024-02-29

## 2024-02-29 PROCEDURE — 1036F TOBACCO NON-USER: CPT | Performed by: INTERNAL MEDICINE

## 2024-02-29 PROCEDURE — 99214 OFFICE O/P EST MOD 30 MIN: CPT | Performed by: INTERNAL MEDICINE

## 2024-02-29 PROCEDURE — 3075F SYST BP GE 130 - 139MM HG: CPT | Performed by: INTERNAL MEDICINE

## 2024-02-29 PROCEDURE — 1160F RVW MEDS BY RX/DR IN RCRD: CPT | Performed by: INTERNAL MEDICINE

## 2024-02-29 PROCEDURE — 1123F ACP DISCUSS/DSCN MKR DOCD: CPT | Performed by: INTERNAL MEDICINE

## 2024-02-29 PROCEDURE — 3078F DIAST BP <80 MM HG: CPT | Performed by: INTERNAL MEDICINE

## 2024-02-29 PROCEDURE — 1159F MED LIST DOCD IN RCRD: CPT | Performed by: INTERNAL MEDICINE

## 2024-02-29 PROCEDURE — G0439 PPPS, SUBSEQ VISIT: HCPCS | Performed by: INTERNAL MEDICINE

## 2024-02-29 PROCEDURE — 1170F FXNL STATUS ASSESSED: CPT | Performed by: INTERNAL MEDICINE

## 2024-02-29 RX ORDER — PANTOPRAZOLE SODIUM 40 MG/1
40 TABLET, DELAYED RELEASE ORAL DAILY
Qty: 90 TABLET | Refills: 1 | Status: SHIPPED | OUTPATIENT
Start: 2024-02-29

## 2024-02-29 RX ORDER — LEVOTHYROXINE SODIUM 50 UG/1
50 TABLET ORAL DAILY
Qty: 90 TABLET | Refills: 1 | Status: SHIPPED | OUTPATIENT
Start: 2024-02-29

## 2024-02-29 RX ORDER — CARVEDILOL 12.5 MG/1
12.5 TABLET ORAL
Qty: 180 TABLET | Refills: 1 | Status: SHIPPED | OUTPATIENT
Start: 2024-02-29 | End: 2024-08-27

## 2024-02-29 RX ORDER — ROSUVASTATIN CALCIUM 40 MG/1
40 TABLET, COATED ORAL DAILY
Qty: 90 TABLET | Refills: 1 | Status: SHIPPED | OUTPATIENT
Start: 2024-02-29 | End: 2024-08-27

## 2024-02-29 RX ORDER — ROPINIROLE 0.5 MG/1
1.5 TABLET, FILM COATED ORAL NIGHTLY
Qty: 270 TABLET | Refills: 1 | Status: SHIPPED | OUTPATIENT
Start: 2024-02-29 | End: 2024-08-27

## 2024-02-29 RX ORDER — ASPIRIN 81 MG/1
81 TABLET ORAL DAILY
Qty: 90 TABLET | Refills: 1 | Status: SHIPPED | OUTPATIENT
Start: 2024-02-29

## 2024-02-29 RX ORDER — PANTOPRAZOLE SODIUM 40 MG/1
40 TABLET, DELAYED RELEASE ORAL DAILY
Qty: 90 TABLET | Refills: 1 | Status: SHIPPED | OUTPATIENT
Start: 2024-02-29 | End: 2024-02-29 | Stop reason: SDUPTHER

## 2024-02-29 SDOH — ECONOMIC STABILITY: FOOD INSECURITY: WITHIN THE PAST 12 MONTHS, THE FOOD YOU BOUGHT JUST DIDN'T LAST AND YOU DIDN'T HAVE MONEY TO GET MORE.: NEVER TRUE

## 2024-02-29 SDOH — ECONOMIC STABILITY: FOOD INSECURITY: WITHIN THE PAST 12 MONTHS, YOU WORRIED THAT YOUR FOOD WOULD RUN OUT BEFORE YOU GOT MONEY TO BUY MORE.: NEVER TRUE

## 2024-02-29 ASSESSMENT — LIFESTYLE VARIABLES
AUDIT-C TOTAL SCORE: 1
HOW OFTEN DO YOU HAVE A DRINK CONTAINING ALCOHOL: MONTHLY OR LESS
HOW OFTEN DO YOU HAVE SIX OR MORE DRINKS ON ONE OCCASION: NEVER
SKIP TO QUESTIONS 9-10: 1
HOW MANY STANDARD DRINKS CONTAINING ALCOHOL DO YOU HAVE ON A TYPICAL DAY: 1 OR 2

## 2024-02-29 ASSESSMENT — ACTIVITIES OF DAILY LIVING (ADL)
MANAGING_FINANCES: INDEPENDENT
TAKING_MEDICATION: INDEPENDENT
DOING_HOUSEWORK: INDEPENDENT
BATHING: INDEPENDENT
DRESSING: INDEPENDENT
GROCERY_SHOPPING: INDEPENDENT

## 2024-02-29 ASSESSMENT — PATIENT HEALTH QUESTIONNAIRE - PHQ9
2. FEELING DOWN, DEPRESSED OR HOPELESS: NOT AT ALL
1. LITTLE INTEREST OR PLEASURE IN DOING THINGS: NOT AT ALL
SUM OF ALL RESPONSES TO PHQ9 QUESTIONS 1 & 2: 0

## 2024-02-29 ASSESSMENT — ENCOUNTER SYMPTOMS
DEPRESSION: 0
OCCASIONAL FEELINGS OF UNSTEADINESS: 1
LOSS OF SENSATION IN FEET: 0

## 2024-02-29 NOTE — PROGRESS NOTES
"Subjective   Reason for Visit: Nathalie Castelan is an 83 y.o. female here for a Medicare Wellness visit.     Past Medical, Surgical, and Family History reviewed and updated in chart.    Reviewed all medications by prescribing practitioner or clinical pharmacist (such as prescriptions, OTCs, herbal therapies and supplements) and documented in the medical record.    [facility]    Follow up and Medicare wellness exam:     paroxysmal atrial fib, history of bioprosthetic valve, history of aortic dissection: Patient is post repair, she still follows up with Dr Monterroso at [facility], feels well now     Right arm pain: pain has resolved, patient stopped crocheting, symptoms are gone now     osteopenia: patient sees Dr Vincent for routine follow up, last DEXA scan completed in 1/2021,  it is due next week, mammogram is ordered also      colon polyps: patient had colonoscopy per Dr Diaz at [facility], no polyps noted, she requires no follow up exams, \"that's what I was told\"     restless legs: patient takes ropinirole     hyperlipidemia. Current treatment includes rosuvastatin 40 mg now, leg discomfort has decreased since not taking atorvastatin      essential hypertension. The patient states she has been doing well with her blood pressure control since the last visit: takes carvedilol. Lightheadedness when standing has resolved.     Vitamin d deficiency: takes calcium and vitamin d daily     hypothyroidism of undetermined etiology : takes Synthroid 50 mcg daily .     Patient Care Team:  Miguel Angel Bal MD as PCP - General  Miguel Angel Bal MD as PCP - [facility]     Review of Systems     otherwise negative aside from what was mentioned above in HPI.     Objective   Vitals:  /76 (BP Location: Left arm, Patient Position: Sitting, BP Cuff Size: Adult)   Pulse 54   Temp 36.2 °C (97.2 °F)   Resp 16   Ht 1.607 m (5' 3.25\")   Wt 80.6 kg (177 lb 9.6 oz)   SpO2 97%   BMI 31.21 kg/m²   "     Physical Exam    Constitutional   General appearance:  well developed, well nourished, overweight, accompanied by   Eyes   Inspection of eyes: Sclera and conjunctiva were normal. Wears glasses  Ears, Nose, Mouth, and Throat   Ears: Auricles: Normal. No carotid bruits, no thyromegaly or masses  Pulmonary   Respiratory assessment: No respiratory distress, normal respiratory rhythm and effort.    Auscultation of Lungs: Clear bilateral breath sounds.   Cardiovascular   Auscultation of heart:  The heart rate was normal. The rhythm was regular. Heart sounds: a click was heard, but normal S1 and normal S2. Prosthetic valve: prosthetic aortic valve heard. A grade 2 systolic murmur was heard at the LUSB. This has not changed, no carotid bruits are appreciated     Exam for edema: No peripheral edema.   Lymphatic   Palpation of lymph nodes in neck: No cervical lymphadenopathy.   Neurologic   Cranial nerves: Nerves 2-12 were intact, no focal neuro defects  Psychiatric   Orientation: Oriented to person, place, and time.    Mood and affect: Normal.     Assessment/Plan   Problem List Items Addressed This Visit       Aortic dissection (CMS/HCC)    Relevant Orders    CBC and Auto Differential    Comprehensive Metabolic Panel    Essential hypertension    Current Assessment & Plan     BP is stable, now, no med changes, follow up with cardiology as scheduled         Relevant Medications    carvedilol (Coreg) 12.5 mg tablet    Other Relevant Orders    Albumin , Urine Random    CBC and Auto Differential    Comprehensive Metabolic Panel    Hypothyroidism    Current Assessment & Plan     No med changes, continue to monitor TSH         Relevant Medications    levothyroxine (Synthroid, Levoxyl) 50 mcg tablet    Other Relevant Orders    CBC and Auto Differential    Comprehensive Metabolic Panel    TSH with reflex to Free T4 if abnormal    Neck pain    Relevant Medications    aspirin 81 mg EC tablet    Other Relevant Orders    CBC  and Auto Differential    Comprehensive Metabolic Panel    Mixed hyperlipidemia    Current Assessment & Plan     Improved control on rosuvastatin,  recheck labs in 6 months          Relevant Medications    rosuvastatin (Crestor) 40 mg tablet    Other Relevant Orders    CBC and Auto Differential    Comprehensive Metabolic Panel    Lipid Panel    Paroxysmal atrial fibrillation (CMS/HCC)    Relevant Medications    carvedilol (Coreg) 12.5 mg tablet    Other Relevant Orders    CBC and Auto Differential    Comprehensive Metabolic Panel    Presence of prosthetic heart valve    Relevant Medications    rosuvastatin (Crestor) 40 mg tablet    Other Relevant Orders    CBC and Auto Differential    Comprehensive Metabolic Panel    Vitamin D insufficiency    Current Assessment & Plan     Stable, continue to monitor         Relevant Orders    CBC and Auto Differential    Comprehensive Metabolic Panel    Vitamin D 25-Hydroxy,Total (for eval of Vitamin D levels)    Obesity (BMI 30.0-34.9)    Relevant Orders    CBC and Auto Differential    Comprehensive Metabolic Panel    Gastroesophageal reflux disease    Relevant Medications    pantoprazole (ProtoNix) 40 mg EC tablet    Other Relevant Orders    CBC and Auto Differential    Comprehensive Metabolic Panel    Acoustic neuroma (CMS/HCC)    Relevant Orders    CBC and Auto Differential    Comprehensive Metabolic Panel    Restless legs syndrome    Current Assessment & Plan     Continue ropinirole as ordered         Relevant Orders    CBC and Auto Differential    Comprehensive Metabolic Panel    Routine general medical examination at health care facility - Primary    Relevant Orders    CBC and Auto Differential    Comprehensive Metabolic Panel    Stage 3a chronic kidney disease (CMS/HCC)    Current Assessment & Plan     Creatinine is stable, continue to monitor         Relevant Orders    CBC and Auto Differential    Comprehensive Metabolic Panel     Other Visit Diagnoses       Restless leg  syndrome        Relevant Medications    rOPINIRole (Requip) 0.5 mg tablet    Other Relevant Orders    CBC and Auto Differential    Comprehensive Metabolic Panel          Medicare wellness exam completed, patient declines all vaccines, continue to follow up every 6 months  Check labs prior to next visit

## 2024-03-04 ENCOUNTER — HOSPITAL ENCOUNTER (OUTPATIENT)
Dept: RADIOLOGY | Facility: CLINIC | Age: 84
Discharge: HOME | End: 2024-03-04
Payer: MEDICARE

## 2024-03-04 DIAGNOSIS — M85.80 OTHER SPECIFIED DISORDERS OF BONE DENSITY AND STRUCTURE, UNSPECIFIED SITE: ICD-10-CM

## 2024-03-04 DIAGNOSIS — Z12.31 ENCOUNTER FOR SCREENING MAMMOGRAM FOR MALIGNANT NEOPLASM OF BREAST: ICD-10-CM

## 2024-03-04 DIAGNOSIS — Z78.0 ASYMPTOMATIC MENOPAUSAL STATE: ICD-10-CM

## 2024-03-04 DIAGNOSIS — Z13.820 ENCOUNTER FOR SCREENING FOR OSTEOPOROSIS: ICD-10-CM

## 2024-03-04 PROCEDURE — 77080 DXA BONE DENSITY AXIAL: CPT

## 2024-03-04 PROCEDURE — 77063 BREAST TOMOSYNTHESIS BI: CPT | Performed by: RADIOLOGY

## 2024-03-04 PROCEDURE — 77067 SCR MAMMO BI INCL CAD: CPT | Performed by: RADIOLOGY

## 2024-03-04 PROCEDURE — 77080 DXA BONE DENSITY AXIAL: CPT | Performed by: RADIOLOGY

## 2024-03-04 PROCEDURE — 77067 SCR MAMMO BI INCL CAD: CPT

## 2024-04-19 ENCOUNTER — TELEPHONE (OUTPATIENT)
Dept: PRIMARY CARE | Facility: CLINIC | Age: 84
End: 2024-04-19
Payer: MEDICARE

## 2024-04-19 DIAGNOSIS — J06.9 ACUTE URI: Primary | ICD-10-CM

## 2024-04-19 RX ORDER — AZITHROMYCIN 250 MG/1
TABLET, FILM COATED ORAL DAILY
Qty: 6 TABLET | Refills: 0 | Status: SHIPPED | OUTPATIENT
Start: 2024-04-19 | End: 2024-04-24

## 2024-04-19 NOTE — TELEPHONE ENCOUNTER
Pt called in and stated she is not feeling well. Pt and  were in recently and  was sick at the time. Pt believes she has the same illness that her  had and is requesting a prescription. Pt has not tested for Covid. Please advise.       How long have you been sick? 3 days   Cough (productive or nonproductive)? Productive   Color of phlegm? Yellow   Sinus pain? No   Sinus drainage/color? No  Earache? No   Congestion? Yes   Headache?No   Fever (if yes, temp)?No   Sore throat? No   Short of breath/wheezing? No   OTC medication? No         Pt's pharmacy [facility] in [place]

## 2024-09-03 ENCOUNTER — APPOINTMENT (OUTPATIENT)
Dept: PRIMARY CARE | Facility: CLINIC | Age: 84
End: 2024-09-03
Payer: MEDICARE

## 2024-10-07 ENCOUNTER — LAB (OUTPATIENT)
Dept: LAB | Facility: LAB | Age: 84
End: 2024-10-07
Payer: MEDICARE

## 2024-10-07 DIAGNOSIS — E78.2 MIXED HYPERLIPIDEMIA: ICD-10-CM

## 2024-10-07 DIAGNOSIS — E03.9 ACQUIRED HYPOTHYROIDISM: ICD-10-CM

## 2024-10-07 DIAGNOSIS — Z95.2 PRESENCE OF PROSTHETIC HEART VALVE: ICD-10-CM

## 2024-10-07 DIAGNOSIS — M54.2 NECK PAIN: ICD-10-CM

## 2024-10-07 DIAGNOSIS — I71.00 DISSECTION OF AORTA, UNSPECIFIED PORTION OF AORTA (MULTI): ICD-10-CM

## 2024-10-07 DIAGNOSIS — E55.9 VITAMIN D INSUFFICIENCY: ICD-10-CM

## 2024-10-07 DIAGNOSIS — K21.9 GASTROESOPHAGEAL REFLUX DISEASE, UNSPECIFIED WHETHER ESOPHAGITIS PRESENT: ICD-10-CM

## 2024-10-07 DIAGNOSIS — E66.811 OBESITY (BMI 30.0-34.9): ICD-10-CM

## 2024-10-07 DIAGNOSIS — G25.81 RESTLESS LEGS SYNDROME: ICD-10-CM

## 2024-10-07 DIAGNOSIS — N18.31 STAGE 3A CHRONIC KIDNEY DISEASE (MULTI): ICD-10-CM

## 2024-10-07 DIAGNOSIS — Z00.00 ROUTINE GENERAL MEDICAL EXAMINATION AT HEALTH CARE FACILITY: ICD-10-CM

## 2024-10-07 DIAGNOSIS — G25.81 RESTLESS LEG SYNDROME: ICD-10-CM

## 2024-10-07 DIAGNOSIS — I48.0 PAROXYSMAL ATRIAL FIBRILLATION (MULTI): ICD-10-CM

## 2024-10-07 DIAGNOSIS — D33.3 ACOUSTIC NEUROMA (MULTI): ICD-10-CM

## 2024-10-07 DIAGNOSIS — I10 ESSENTIAL HYPERTENSION: ICD-10-CM

## 2024-10-07 LAB
25(OH)D3 SERPL-MCNC: 54 NG/ML (ref 30–100)
ALBUMIN SERPL BCP-MCNC: 4.2 G/DL (ref 3.4–5)
ALP SERPL-CCNC: 104 U/L (ref 33–136)
ALT SERPL W P-5'-P-CCNC: 16 U/L (ref 7–45)
ANION GAP SERPL CALC-SCNC: 12 MMOL/L (ref 10–20)
AST SERPL W P-5'-P-CCNC: 19 U/L (ref 9–39)
BASOPHILS # BLD AUTO: 0.07 X10*3/UL (ref 0–0.1)
BASOPHILS NFR BLD AUTO: 1.3 %
BILIRUB SERPL-MCNC: 0.7 MG/DL (ref 0–1.2)
BUN SERPL-MCNC: 18 MG/DL (ref 6–23)
CALCIUM SERPL-MCNC: 9.3 MG/DL (ref 8.6–10.3)
CHLORIDE SERPL-SCNC: 107 MMOL/L (ref 98–107)
CHOLEST SERPL-MCNC: 182 MG/DL (ref 0–199)
CHOLESTEROL/HDL RATIO: 2.4
CO2 SERPL-SCNC: 26 MMOL/L (ref 21–32)
CREAT SERPL-MCNC: 0.99 MG/DL (ref 0.5–1.05)
CREAT UR-MCNC: 46.5 MG/DL (ref 20–320)
EGFRCR SERPLBLD CKD-EPI 2021: 56 ML/MIN/1.73M*2
EOSINOPHIL # BLD AUTO: 0.17 X10*3/UL (ref 0–0.4)
EOSINOPHIL NFR BLD AUTO: 3.1 %
ERYTHROCYTE [DISTWIDTH] IN BLOOD BY AUTOMATED COUNT: 13.8 % (ref 11.5–14.5)
GLUCOSE SERPL-MCNC: 97 MG/DL (ref 74–99)
HCT VFR BLD AUTO: 40.4 % (ref 36–46)
HDLC SERPL-MCNC: 76.7 MG/DL
HGB BLD-MCNC: 12.9 G/DL (ref 12–16)
IMM GRANULOCYTES # BLD AUTO: 0.01 X10*3/UL (ref 0–0.5)
IMM GRANULOCYTES NFR BLD AUTO: 0.2 % (ref 0–0.9)
LDLC SERPL CALC-MCNC: 89 MG/DL
LYMPHOCYTES # BLD AUTO: 1.52 X10*3/UL (ref 0.8–3)
LYMPHOCYTES NFR BLD AUTO: 27.5 %
MCH RBC QN AUTO: 31.8 PG (ref 26–34)
MCHC RBC AUTO-ENTMCNC: 31.9 G/DL (ref 32–36)
MCV RBC AUTO: 100 FL (ref 80–100)
MICROALBUMIN UR-MCNC: <7 MG/L
MICROALBUMIN/CREAT UR: NORMAL MG/G{CREAT}
MONOCYTES # BLD AUTO: 0.55 X10*3/UL (ref 0.05–0.8)
MONOCYTES NFR BLD AUTO: 9.9 %
NEUTROPHILS # BLD AUTO: 3.21 X10*3/UL (ref 1.6–5.5)
NEUTROPHILS NFR BLD AUTO: 58 %
NON HDL CHOLESTEROL: 105 MG/DL (ref 0–149)
NRBC BLD-RTO: 0 /100 WBCS (ref 0–0)
PLATELET # BLD AUTO: 194 X10*3/UL (ref 150–450)
POTASSIUM SERPL-SCNC: 4.5 MMOL/L (ref 3.5–5.3)
PROT SERPL-MCNC: 6.8 G/DL (ref 6.4–8.2)
RBC # BLD AUTO: 4.06 X10*6/UL (ref 4–5.2)
SODIUM SERPL-SCNC: 140 MMOL/L (ref 136–145)
TRIGL SERPL-MCNC: 81 MG/DL (ref 0–149)
TSH SERPL-ACNC: 2.53 MIU/L (ref 0.44–3.98)
VLDL: 16 MG/DL (ref 0–40)
WBC # BLD AUTO: 5.5 X10*3/UL (ref 4.4–11.3)

## 2024-10-07 PROCEDURE — 82043 UR ALBUMIN QUANTITATIVE: CPT

## 2024-10-07 PROCEDURE — 84443 ASSAY THYROID STIM HORMONE: CPT

## 2024-10-07 PROCEDURE — 36415 COLL VENOUS BLD VENIPUNCTURE: CPT

## 2024-10-07 PROCEDURE — 80061 LIPID PANEL: CPT

## 2024-10-07 PROCEDURE — 85025 COMPLETE CBC W/AUTO DIFF WBC: CPT

## 2024-10-07 PROCEDURE — 80053 COMPREHEN METABOLIC PANEL: CPT

## 2024-10-07 PROCEDURE — 82570 ASSAY OF URINE CREATININE: CPT

## 2024-10-07 PROCEDURE — 82306 VITAMIN D 25 HYDROXY: CPT

## 2024-10-16 ENCOUNTER — APPOINTMENT (OUTPATIENT)
Dept: PRIMARY CARE | Facility: CLINIC | Age: 84
End: 2024-10-16
Payer: MEDICARE

## 2024-10-16 VITALS
BODY MASS INDEX: 30.23 KG/M2 | OXYGEN SATURATION: 95 % | WEIGHT: 172 LBS | SYSTOLIC BLOOD PRESSURE: 118 MMHG | HEART RATE: 70 BPM | RESPIRATION RATE: 20 BRPM | TEMPERATURE: 96.8 F | DIASTOLIC BLOOD PRESSURE: 73 MMHG

## 2024-10-16 DIAGNOSIS — M85.80 OSTEOPENIA, UNSPECIFIED LOCATION: ICD-10-CM

## 2024-10-16 DIAGNOSIS — E55.9 VITAMIN D INSUFFICIENCY: ICD-10-CM

## 2024-10-16 DIAGNOSIS — E03.9 ACQUIRED HYPOTHYROIDISM: ICD-10-CM

## 2024-10-16 DIAGNOSIS — I10 ESSENTIAL HYPERTENSION: Primary | ICD-10-CM

## 2024-10-16 DIAGNOSIS — E78.2 MIXED HYPERLIPIDEMIA: ICD-10-CM

## 2024-10-16 DIAGNOSIS — Z95.2 PRESENCE OF PROSTHETIC HEART VALVE: ICD-10-CM

## 2024-10-16 DIAGNOSIS — N18.31 STAGE 3A CHRONIC KIDNEY DISEASE (MULTI): ICD-10-CM

## 2024-10-16 PROCEDURE — 3074F SYST BP LT 130 MM HG: CPT | Performed by: INTERNAL MEDICINE

## 2024-10-16 PROCEDURE — 1036F TOBACCO NON-USER: CPT | Performed by: INTERNAL MEDICINE

## 2024-10-16 PROCEDURE — 99214 OFFICE O/P EST MOD 30 MIN: CPT | Performed by: INTERNAL MEDICINE

## 2024-10-16 PROCEDURE — 1126F AMNT PAIN NOTED NONE PRSNT: CPT | Performed by: INTERNAL MEDICINE

## 2024-10-16 PROCEDURE — 1123F ACP DISCUSS/DSCN MKR DOCD: CPT | Performed by: INTERNAL MEDICINE

## 2024-10-16 PROCEDURE — 1159F MED LIST DOCD IN RCRD: CPT | Performed by: INTERNAL MEDICINE

## 2024-10-16 PROCEDURE — 3078F DIAST BP <80 MM HG: CPT | Performed by: INTERNAL MEDICINE

## 2024-10-16 SDOH — ECONOMIC STABILITY: FOOD INSECURITY: WITHIN THE PAST 12 MONTHS, THE FOOD YOU BOUGHT JUST DIDN'T LAST AND YOU DIDN'T HAVE MONEY TO GET MORE.: NEVER TRUE

## 2024-10-16 SDOH — ECONOMIC STABILITY: FOOD INSECURITY: WITHIN THE PAST 12 MONTHS, YOU WORRIED THAT YOUR FOOD WOULD RUN OUT BEFORE YOU GOT MONEY TO BUY MORE.: NEVER TRUE

## 2024-10-16 ASSESSMENT — LIFESTYLE VARIABLES
AUDIT-C TOTAL SCORE: 0
SKIP TO QUESTIONS 9-10: 1
HOW OFTEN DO YOU HAVE A DRINK CONTAINING ALCOHOL: NEVER
HOW OFTEN DO YOU HAVE SIX OR MORE DRINKS ON ONE OCCASION: NEVER
HOW MANY STANDARD DRINKS CONTAINING ALCOHOL DO YOU HAVE ON A TYPICAL DAY: PATIENT DOES NOT DRINK

## 2024-10-16 ASSESSMENT — PAIN SCALES - GENERAL: PAINLEVEL_OUTOF10: 0-NO PAIN

## 2024-10-16 NOTE — PROGRESS NOTES
Chief Complaint/HPI:    Follow up :      paroxysmal atrial fib, history of bioprosthetic valve, history of aortic dissection: Patient is post repair, she still follows up with Dr Monterroso at [facility], feels well now, she had ECHO   completed     Right arm pain: pain has resolved, patient stopped crocheting, symptoms are gone now     osteopenia: patient sees Dr Vincent for routine follow up, last DEXA scan was completed, mammogram is completed every 2 years , it has just been completed also.     colon polyps: patient had colonoscopy per Dr Diaz at [facility], no polyps noted, she requires no follow up exams,      restless legs: patient takes ropinirole     hyperlipidemia. Current treatment includes rosuvastatin 40 mg now      essential hypertension. The patient states she has been doing well with her blood pressure control since the last visit: takes carvedilol.     Vitamin d deficiency: takes calcium and vitamin d daily     hypothyroidism of undetermined etiology : takes Synthroid 50 mcg daily .     ROS otherwise negative aside from what was mentioned above in HPI.      Patient Active Problem List   Diagnosis    Aneurysm of aortic arch (CMS-HCC)    Acute URI    Acute pain of both hips    Acute bronchitis    Aortic dissection (Multi)    Bicuspid aortic valve    Colon polyps    Dermatitis    Dysuria    Essential hypertension    Maxillary sinusitis, acute    Low back pain, episodic    Irregular heart rhythm    Hypothyroidism    Osteoarthrosis    Neck pain    Mixed hyperlipidemia    Hyperlipidemia    Paroxysmal atrial fibrillation (Multi)    Osteopenia    Presence of prosthetic heart valve    Rib pain on right side    Spinal stenosis of cervical region    Vitamin D insufficiency    Biceps tendonitis on right    Pigmented skin lesion of uncertain behavior of neck    Rotator cuff tear arthropathy of right shoulder    Diarrhea    H/O stentless aortic valve replacement    Obesity (BMI 30.0-34.9)    Gastroesophageal  reflux disease    Acoustic neuroma (Multi)    Diverticula of intestine    Loss of hair    Nasal congestion    Post-menopausal osteoporosis    Restless legs syndrome    Spasm    Routine general medical examination at health care facility    Stage 3a chronic kidney disease (Multi)         Past Medical History:   Diagnosis Date    Benign neoplasm of cranial nerves     Vestibular schwannoma    Diverticulitis of intestine, part unspecified, without perforation or abscess without bleeding     Diverticulitis    Diverticulosis of intestine, part unspecified, without perforation or abscess without bleeding     Diverticulosis    Epidermolysis bullosa, 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    Epidermolysis bullosa    Personal history of other diseases of the digestive system     History of esophageal reflux    Personal history of other diseases of the digestive system 11/05/2019    History of gastroesophageal reflux (GERD)    Personal history of other diseases of the musculoskeletal system and connective tissue     History of arthritis    Personal history of other diseases of the musculoskeletal system and connective tissue 11/06/2019    History of muscle spasm    Personal history of other diseases of the nervous system and sense organs 11/05/2019    History of restless legs syndrome    Personal history of other diseases of the nervous system and sense organs 03/03/2022    History of Guillain-Stanley syndrome    Personal history of other drug therapy 02/02/2020    History of influenza vaccination    Personal history of other specified conditions 11/20/2019    History of nasal congestion    Presence of xenogenic heart valve 08/25/2022    History of aortic valve replacement with bioprosthetic valve    Restless legs syndrome     Restless leg syndrome     Past Surgical History:   Procedure Laterality Date    AORTIC VALVE REPLACEMENT  08/04/2016    Aortic Valve Replacement    CATARACT EXTRACTION  02/15/2017    Cataract Surgery    CT ABDOMEN PELVIS  ANGIOGRAM W AND/OR WO IV CONTRAST  10/5/2015    CT ABDOMEN PELVIS ANGIOGRAM W AND/OR WO IV CONTRAST 10/5/2015 [facility] ANCILLARY LEGACY    HAND SURGERY  08/04/2016    Hand Surgery                                                                                                                                                          HERNIA REPAIR  08/04/2016    Hernia Repair    HYSTERECTOMY  08/04/2016    Hysterectomy    IR CVC TUNNELED  9/3/2015    IR CVC TUNNELED 9/3/2015 [facility] CLINICAL LEGACY    OTHER SURGICAL HISTORY  08/04/2016    Asc Aortic Aneurysm Graft, Coronary Reconst, Root Replace     Social History     Social History Narrative    Not on file         ALLERGIES  Patient has no known allergies.      MEDICATIONS  Current Outpatient Medications on File Prior to Visit   Medication Sig Dispense Refill    aspirin 81 mg EC tablet Take 1 tablet (81 mg) by mouth once daily. 90 tablet 1    calcium carbonate-vit D3-min 600 mg calcium- 400 unit tablet Take 1 tablet by mouth once daily.      carvedilol (Coreg) 12.5 mg tablet Take 1 tablet (12.5 mg) by mouth 2 times a day with meals. 180 tablet 1    levothyroxine (Synthroid, Levoxyl) 50 mcg tablet Take 1 tablet (50 mcg) by mouth once daily. 90 tablet 1    pantoprazole (ProtoNix) 40 mg EC tablet Take 1 tablet (40 mg) by mouth once daily. 90 tablet 1    rOPINIRole (Requip) 0.5 mg tablet Take 3 tablets (1.5 mg) by mouth once daily at bedtime. 270 tablet 1    rosuvastatin (Crestor) 40 mg tablet Take 1 tablet (40 mg) by mouth once daily. 90 tablet 1    cholecalciferol (Vitamin D-3) 25 MCG (1000 UT) capsule Take 1 capsule (25 mcg) by mouth once daily. (Patient not taking: Reported on 10/16/2024)       No current facility-administered medications on file prior to visit.         PHYSICAL EXAM  /73 (BP Location: Left arm, Patient Position: Sitting)   Pulse 70   Temp 36 °C (96.8 °F) (Temporal)   Resp 20   Wt 78 kg (172 lb)   SpO2 95%   BMI 30.23 kg/m²   Body mass index is  30.23 kg/m².    Constitutional   General appearance:  well developed, well nourished, overweight, accompanied by   Eyes   Inspection of eyes: Sclera and conjunctiva were normal. Wears glasses  Ears, Nose, Mouth, and Throat   Ears: Auricles: Normal. No carotid bruits, no thyromegaly or masses  Pulmonary   Respiratory assessment: No respiratory distress, normal respiratory rhythm and effort.    Auscultation of Lungs: Clear bilateral breath sounds.   Cardiovascular   Auscultation of heart:  The heart rate was normal. The rhythm was regular. Heart sounds: a click was heard, but normal S1 and normal S2. Prosthetic valve: prosthetic aortic valve heard. A grade 2/6 systolic murmur was heard at the LUSB. This has not changed, no carotid bruits are appreciated     Exam for edema: No peripheral edema.   Lymphatic   Palpation of lymph nodes in neck: No cervical lymphadenopathy.   Neurologic   Cranial nerves: Nerves 2-12 were intact, no focal neuro defects  Psychiatric   Orientation: Oriented to person, place, and time.    Mood and affect: Normal.     ASSESSMENT/PLAN  Problem List Items Addressed This Visit       Essential hypertension - Primary    Current Assessment & Plan     BP is stable, continue current med therapies, no med changes needed         Relevant Orders    Albumin-Creatinine Ratio, Urine Random    CBC and Auto Differential    Comprehensive Metabolic Panel    Hypothyroidism    Current Assessment & Plan     Stable at present, no med changes, recheck labs in 6 months         Relevant Orders    CBC and Auto Differential    Comprehensive Metabolic Panel    TSH with reflex to Free T4 if abnormal    Mixed hyperlipidemia    Current Assessment & Plan     Controlled, continue rosuvastatin as ordered, recheck labs in 6 months         Relevant Orders    CBC and Auto Differential    Comprehensive Metabolic Panel    Lipid Panel    Osteopenia    Current Assessment & Plan     DEXA was completed, continue calcium and  vitamin d         Relevant Orders    CBC and Auto Differential    Comprehensive Metabolic Panel    Vitamin D 25-Hydroxy,Total (for eval of Vitamin D levels)    Presence of prosthetic heart valve    Current Assessment & Plan     Patient is stable, sees cardiology as scheduled         Relevant Orders    CBC and Auto Differential    Comprehensive Metabolic Panel    Stage 3a chronic kidney disease (Multi)    Current Assessment & Plan     Renal function is stable, no changes at present time         Relevant Orders    Albumin-Creatinine Ratio, Urine Random    CBC and Auto Differential    Comprehensive Metabolic Panel    Vitamin D 25-Hydroxy,Total (for eval of Vitamin D levels)    Vitamin D insufficiency    Relevant Orders    CBC and Auto Differential    Comprehensive Metabolic Panel    Vitamin D 25-Hydroxy,Total (for eval of Vitamin D levels)     Follow up, no med changes, recheck labs in 6 months, follow up as scheduled after labs are completed.     Miguel Angel Bal MD

## 2024-10-17 DIAGNOSIS — G25.81 RESTLESS LEG SYNDROME: ICD-10-CM

## 2024-10-17 DIAGNOSIS — K21.9 GASTROESOPHAGEAL REFLUX DISEASE, UNSPECIFIED WHETHER ESOPHAGITIS PRESENT: ICD-10-CM

## 2024-10-17 DIAGNOSIS — E03.9 ACQUIRED HYPOTHYROIDISM: ICD-10-CM

## 2024-10-17 DIAGNOSIS — E78.2 MIXED HYPERLIPIDEMIA: ICD-10-CM

## 2024-10-17 DIAGNOSIS — I10 ESSENTIAL HYPERTENSION: ICD-10-CM

## 2024-10-17 DIAGNOSIS — M54.2 NECK PAIN: ICD-10-CM

## 2024-10-17 DIAGNOSIS — Z95.2 PRESENCE OF PROSTHETIC HEART VALVE: ICD-10-CM

## 2024-10-17 RX ORDER — PANTOPRAZOLE SODIUM 40 MG/1
40 TABLET, DELAYED RELEASE ORAL DAILY
Qty: 90 TABLET | Refills: 1 | Status: SHIPPED | OUTPATIENT
Start: 2024-10-17

## 2024-10-17 RX ORDER — ROSUVASTATIN CALCIUM 40 MG/1
40 TABLET, COATED ORAL DAILY
Qty: 90 TABLET | Refills: 1 | Status: SHIPPED | OUTPATIENT
Start: 2024-10-17 | End: 2025-04-15

## 2024-10-17 RX ORDER — LEVOTHYROXINE SODIUM 50 UG/1
50 TABLET ORAL DAILY
Qty: 90 TABLET | Refills: 1 | Status: SHIPPED | OUTPATIENT
Start: 2024-10-17

## 2024-10-17 RX ORDER — ROPINIROLE 0.5 MG/1
1.5 TABLET, FILM COATED ORAL NIGHTLY
Qty: 270 TABLET | Refills: 1 | Status: SHIPPED | OUTPATIENT
Start: 2024-10-17 | End: 2025-04-15

## 2024-10-17 RX ORDER — ROSUVASTATIN CALCIUM 40 MG/1
40 TABLET, COATED ORAL DAILY
Qty: 90 TABLET | Refills: 0 | Status: SHIPPED | OUTPATIENT
Start: 2024-10-17

## 2024-10-17 RX ORDER — CARVEDILOL 12.5 MG/1
12.5 TABLET ORAL
Qty: 180 TABLET | Refills: 0 | Status: SHIPPED | OUTPATIENT
Start: 2024-10-17

## 2024-10-17 RX ORDER — ROPINIROLE 0.5 MG/1
TABLET, FILM COATED ORAL
Qty: 270 TABLET | Refills: 0 | Status: SHIPPED | OUTPATIENT
Start: 2024-10-17

## 2024-10-17 RX ORDER — LEVOTHYROXINE SODIUM 50 UG/1
50 TABLET ORAL DAILY
Qty: 90 TABLET | Refills: 0 | Status: SHIPPED | OUTPATIENT
Start: 2024-10-17

## 2024-10-17 RX ORDER — CARVEDILOL 12.5 MG/1
12.5 TABLET ORAL
Qty: 180 TABLET | Refills: 1 | Status: SHIPPED | OUTPATIENT
Start: 2024-10-17 | End: 2025-04-15

## 2024-10-17 RX ORDER — PANTOPRAZOLE SODIUM 40 MG/1
40 TABLET, DELAYED RELEASE ORAL DAILY
Qty: 90 TABLET | Refills: 0 | Status: SHIPPED | OUTPATIENT
Start: 2024-10-17

## 2024-10-17 RX ORDER — ASPIRIN 81 MG/1
81 TABLET ORAL DAILY
Qty: 90 TABLET | Refills: 1 | Status: SHIPPED | OUTPATIENT
Start: 2024-10-17

## 2025-04-19 LAB
25(OH)D3+25(OH)D2 SERPL-MCNC: 40 NG/ML (ref 30–100)
ALBUMIN SERPL-MCNC: 4.2 G/DL (ref 3.6–5.1)
ALBUMIN/CREAT UR: 14 MG/G CREAT
ALP SERPL-CCNC: 74 U/L (ref 37–153)
ALT SERPL-CCNC: 11 U/L (ref 6–29)
ANION GAP SERPL CALCULATED.4IONS-SCNC: 8 MMOL/L (CALC) (ref 7–17)
AST SERPL-CCNC: 18 U/L (ref 10–35)
BASOPHILS # BLD AUTO: 83 CELLS/UL (ref 0–200)
BASOPHILS NFR BLD AUTO: 1.1 %
BILIRUB SERPL-MCNC: 0.6 MG/DL (ref 0.2–1.2)
BUN SERPL-MCNC: 18 MG/DL (ref 7–25)
CALCIUM SERPL-MCNC: 8.7 MG/DL (ref 8.6–10.4)
CHLORIDE SERPL-SCNC: 108 MMOL/L (ref 98–110)
CHOLEST SERPL-MCNC: 181 MG/DL
CHOLEST/HDLC SERPL: 2.2 (CALC)
CO2 SERPL-SCNC: 27 MMOL/L (ref 20–32)
CREAT SERPL-MCNC: 0.84 MG/DL (ref 0.6–0.95)
CREAT UR-MCNC: 107 MG/DL (ref 20–275)
EGFRCR SERPLBLD CKD-EPI 2021: 68 ML/MIN/1.73M2
EOSINOPHIL # BLD AUTO: 203 CELLS/UL (ref 15–500)
EOSINOPHIL NFR BLD AUTO: 2.7 %
ERYTHROCYTE [DISTWIDTH] IN BLOOD BY AUTOMATED COUNT: 14 % (ref 11–15)
GLUCOSE SERPL-MCNC: 113 MG/DL (ref 65–99)
HCT VFR BLD AUTO: 40.3 % (ref 35–45)
HDLC SERPL-MCNC: 83 MG/DL
HGB BLD-MCNC: 12.7 G/DL (ref 11.7–15.5)
LDLC SERPL CALC-MCNC: 81 MG/DL (CALC)
LYMPHOCYTES # BLD AUTO: 1590 CELLS/UL (ref 850–3900)
LYMPHOCYTES NFR BLD AUTO: 21.2 %
MCH RBC QN AUTO: 31.6 PG (ref 27–33)
MCHC RBC AUTO-ENTMCNC: 31.5 G/DL (ref 32–36)
MCV RBC AUTO: 100.2 FL (ref 80–100)
MICROALBUMIN UR-MCNC: 1.5 MG/DL
MONOCYTES # BLD AUTO: 720 CELLS/UL (ref 200–950)
MONOCYTES NFR BLD AUTO: 9.6 %
NEUTROPHILS # BLD AUTO: 4905 CELLS/UL (ref 1500–7800)
NEUTROPHILS NFR BLD AUTO: 65.4 %
NONHDLC SERPL-MCNC: 98 MG/DL (CALC)
PLATELET # BLD AUTO: 206 THOUSAND/UL (ref 140–400)
PMV BLD REES-ECKER: 11.2 FL (ref 7.5–12.5)
POTASSIUM SERPL-SCNC: 4.2 MMOL/L (ref 3.5–5.3)
PROT SERPL-MCNC: 6.7 G/DL (ref 6.1–8.1)
RBC # BLD AUTO: 4.02 MILLION/UL (ref 3.8–5.1)
SODIUM SERPL-SCNC: 143 MMOL/L (ref 135–146)
TRIGL SERPL-MCNC: 83 MG/DL
TSH SERPL-ACNC: 2.66 MIU/L (ref 0.4–4.5)
WBC # BLD AUTO: 7.5 THOUSAND/UL (ref 3.8–10.8)

## 2025-04-22 ENCOUNTER — APPOINTMENT (OUTPATIENT)
Dept: PRIMARY CARE | Facility: CLINIC | Age: 85
End: 2025-04-22
Payer: COMMERCIAL

## 2025-04-22 VITALS
SYSTOLIC BLOOD PRESSURE: 115 MMHG | DIASTOLIC BLOOD PRESSURE: 67 MMHG | BODY MASS INDEX: 31.89 KG/M2 | TEMPERATURE: 96.2 F | HEIGHT: 63 IN | OXYGEN SATURATION: 96 % | WEIGHT: 180 LBS | HEART RATE: 84 BPM | RESPIRATION RATE: 14 BRPM

## 2025-04-22 DIAGNOSIS — E03.9 ACQUIRED HYPOTHYROIDISM: ICD-10-CM

## 2025-04-22 DIAGNOSIS — E55.9 VITAMIN D INSUFFICIENCY: ICD-10-CM

## 2025-04-22 DIAGNOSIS — Z00.00 ROUTINE GENERAL MEDICAL EXAMINATION AT HEALTH CARE FACILITY: Primary | ICD-10-CM

## 2025-04-22 DIAGNOSIS — I48.0 PAROXYSMAL ATRIAL FIBRILLATION (MULTI): ICD-10-CM

## 2025-04-22 DIAGNOSIS — R73.9 HYPERGLYCEMIA: ICD-10-CM

## 2025-04-22 DIAGNOSIS — D75.89 MACROCYTOSIS: ICD-10-CM

## 2025-04-22 DIAGNOSIS — E78.2 MIXED HYPERLIPIDEMIA: ICD-10-CM

## 2025-04-22 DIAGNOSIS — D33.3 ACOUSTIC NEUROMA (MULTI): ICD-10-CM

## 2025-04-22 PROBLEM — N18.31 STAGE 3A CHRONIC KIDNEY DISEASE (MULTI): Status: RESOLVED | Noted: 2024-02-29 | Resolved: 2025-04-22

## 2025-04-22 PROCEDURE — 1170F FXNL STATUS ASSESSED: CPT | Performed by: INTERNAL MEDICINE

## 2025-04-22 PROCEDURE — 99214 OFFICE O/P EST MOD 30 MIN: CPT | Performed by: INTERNAL MEDICINE

## 2025-04-22 PROCEDURE — G0439 PPPS, SUBSEQ VISIT: HCPCS | Performed by: INTERNAL MEDICINE

## 2025-04-22 PROCEDURE — 1159F MED LIST DOCD IN RCRD: CPT | Performed by: INTERNAL MEDICINE

## 2025-04-22 PROCEDURE — 1160F RVW MEDS BY RX/DR IN RCRD: CPT | Performed by: INTERNAL MEDICINE

## 2025-04-22 PROCEDURE — 3074F SYST BP LT 130 MM HG: CPT | Performed by: INTERNAL MEDICINE

## 2025-04-22 PROCEDURE — 1126F AMNT PAIN NOTED NONE PRSNT: CPT | Performed by: INTERNAL MEDICINE

## 2025-04-22 PROCEDURE — 1036F TOBACCO NON-USER: CPT | Performed by: INTERNAL MEDICINE

## 2025-04-22 PROCEDURE — 3078F DIAST BP <80 MM HG: CPT | Performed by: INTERNAL MEDICINE

## 2025-04-22 PROCEDURE — 1123F ACP DISCUSS/DSCN MKR DOCD: CPT | Performed by: INTERNAL MEDICINE

## 2025-04-22 RX ORDER — VIT C/E/ZN/COPPR/LUTEIN/ZEAXAN 250MG-90MG
25 CAPSULE ORAL DAILY
Qty: 100 CAPSULE | Refills: 3
Start: 2025-04-22 | End: 2026-04-22

## 2025-04-22 SDOH — ECONOMIC STABILITY: FOOD INSECURITY: WITHIN THE PAST 12 MONTHS, THE FOOD YOU BOUGHT JUST DIDN'T LAST AND YOU DIDN'T HAVE MONEY TO GET MORE.: NEVER TRUE

## 2025-04-22 SDOH — ECONOMIC STABILITY: FOOD INSECURITY: WITHIN THE PAST 12 MONTHS, YOU WORRIED THAT YOUR FOOD WOULD RUN OUT BEFORE YOU GOT MONEY TO BUY MORE.: NEVER TRUE

## 2025-04-22 ASSESSMENT — LIFESTYLE VARIABLES
AUDIT-C TOTAL SCORE: 0
SKIP TO QUESTIONS 9-10: 1
HOW MANY STANDARD DRINKS CONTAINING ALCOHOL DO YOU HAVE ON A TYPICAL DAY: PATIENT DOES NOT DRINK
HOW OFTEN DO YOU HAVE A DRINK CONTAINING ALCOHOL: NEVER
HOW OFTEN DO YOU HAVE SIX OR MORE DRINKS ON ONE OCCASION: NEVER

## 2025-04-22 ASSESSMENT — PATIENT HEALTH QUESTIONNAIRE - PHQ9
1. LITTLE INTEREST OR PLEASURE IN DOING THINGS: NOT AT ALL
2. FEELING DOWN, DEPRESSED OR HOPELESS: NOT AT ALL
SUM OF ALL RESPONSES TO PHQ9 QUESTIONS 1 & 2: 0

## 2025-04-22 ASSESSMENT — ACTIVITIES OF DAILY LIVING (ADL)
GROCERY_SHOPPING: INDEPENDENT
DRESSING: INDEPENDENT
TAKING_MEDICATION: INDEPENDENT
MANAGING_FINANCES: INDEPENDENT
BATHING: INDEPENDENT
DOING_HOUSEWORK: INDEPENDENT

## 2025-04-22 ASSESSMENT — ENCOUNTER SYMPTOMS
DEPRESSION: 0
OCCASIONAL FEELINGS OF UNSTEADINESS: 0
LOSS OF SENSATION IN FEET: 0

## 2025-04-22 ASSESSMENT — ANXIETY QUESTIONNAIRES
2. NOT BEING ABLE TO STOP OR CONTROL WORRYING: NOT AT ALL
4. TROUBLE RELAXING: NOT AT ALL
5. BEING SO RESTLESS THAT IT IS HARD TO SIT STILL: NOT AT ALL
1. FEELING NERVOUS, ANXIOUS, OR ON EDGE: NOT AT ALL
6. BECOMING EASILY ANNOYED OR IRRITABLE: NOT AT ALL
IF YOU CHECKED OFF ANY PROBLEMS ON THIS QUESTIONNAIRE, HOW DIFFICULT HAVE THESE PROBLEMS MADE IT FOR YOU TO DO YOUR WORK, TAKE CARE OF THINGS AT HOME, OR GET ALONG WITH OTHER PEOPLE: NOT DIFFICULT AT ALL
GAD7 TOTAL SCORE: 0
3. WORRYING TOO MUCH ABOUT DIFFERENT THINGS: NOT AT ALL
7. FEELING AFRAID AS IF SOMETHING AWFUL MIGHT HAPPEN: NOT AT ALL

## 2025-04-22 ASSESSMENT — PAIN SCALES - GENERAL: PAINLEVEL_OUTOF10: 0-NO PAIN

## 2025-04-22 NOTE — ASSESSMENT & PLAN NOTE
LDL is 81, no med changes at present, patient is 84 years old  Orders:    CBC and Auto Differential; Future    Comprehensive Metabolic Panel; Future    Lipid Panel; Future

## 2025-04-22 NOTE — ASSESSMENT & PLAN NOTE
Check B 12 and folate with next blood work.   Orders:    CBC and Auto Differential; Future    Comprehensive Metabolic Panel; Future    Vitamin B12; Future    Folate; Future

## 2025-04-22 NOTE — ASSESSMENT & PLAN NOTE
Patient takes calcium and vitamin d, she does not take any additional supplements, will add low dose vitamin d to regimen    Recheck labs in 5 months, follow up in 6 monthsOrders:    CBC and Auto Differential; Future    Comprehensive Metabolic Panel; Future    Vitamin D 25-Hydroxy,Total (for eval of Vitamin D levels); Future    cholecalciferol (Vitamin D3) 25 mcg (1,000 units) capsule; Take 1 capsule (25 mcg) by mouth once daily.

## 2025-04-22 NOTE — PROGRESS NOTES
"Subjective   Reason for Visit: Nathalie Castelan is an 84 y.o. female here for a Medicare Wellness visit.     Past Medical, Surgical, and Family History reviewed and updated in chart.    Reviewed all medications by prescribing practitioner or clinical pharmacist (such as prescriptions, OTCs, herbal therapies and supplements) and documented in the medical record.    HPI    Follow up and Medicare wellness exam:     paroxysmal atrial fib, history of bioprosthetic valve, history of aortic dissection: Patient is post repair, she still follows up with Dr Monterroso at [facility], feels well now     Right arm pain: pain has resolved, patient stopped crocheting, symptoms are gone now     osteopenia: patient sees Dr Vincent for routine follow up, follow up DEXA to be completed in 2026, mammogram is completed every 2 years     colon polyps: patient had colonoscopy per Dr Diaz at [facility], no polyps noted, she requires no follow up exams, patient states     restless legs: patient takes ropinirole     hyperlipidemia. Current treatment includes rosuvastatin 40 mg now      essential hypertension. The patient states she has been doing well with her blood pressure control since the last visit: takes carvedilol.     Vitamin d deficiency: takes calcium and vitamin d daily,;patient states that never got a prescription for vitamin d      hypothyroidism of undetermined etiology : takes Synthroid 50 mcg daily    Macrocytosis: noted on most recent labs, she does not take B12    Patient Care Team:  Miguel Angel Bal MD as PCP - General (Internal Medicine)  Miguel Angel Bal MD as PCP [facility]     Review of Systems    otherwise negative aside from what was mentioned above in HPI     Objective   Vitals:  /67 (BP Location: Left arm, Patient Position: Sitting, BP Cuff Size: Adult)   Pulse 84   Temp 35.7 °C (96.2 °F) (Temporal)   Resp 14   Ht 1.6 m (5' 3\")   Wt 81.6 kg (180 lb)   SpO2 96%   BMI " 31.89 kg/m²       Physical Exam      Constitutional   General appearance:  well developed, well nourished, overweight, accompanied by   Eyes   Inspection of eyes: Sclera and conjunctiva were normal. Wears glasses  Ears, Nose, Mouth, and Throat   Ears: Auricles: Normal. No carotid bruits, no thyromegaly or masses  Pulmonary   Respiratory assessment: No respiratory distress, normal respiratory rhythm and effort.    Auscultation of Lungs: Clear bilateral breath sounds.   Cardiovascular   Auscultation of heart:  The heart rate was normal. The rhythm was regular, occasional premature beat noted. Heart sounds: a click was heard, but normal S1 and normal S2. Prosthetic valve: prosthetic aortic valve heard. A grade 2/6 systolic murmur was heard at the LUSB. This has not changed, no carotid bruits are appreciated     Exam for edema: trace ankle edema noted bilaterally.   Lymphatic   Palpation of lymph nodes in neck: No cervical lymphadenopathy.   Neurologic   Cranial nerves: Nerves 2-12 were intact, no focal neuro defects  Psychiatric   Orientation: Oriented to person, place, and time.    Mood and affect: Normal.   Assessment & Plan  Routine general medical examination at health care facility Medicare wellness exam completed  Orders:    1 Year Follow Up In Primary Care - Wellness Exam; Future    CBC and Auto Differential; Future    Comprehensive Metabolic Panel; Future    Paroxysmal atrial fibrillation (Multi)  Patient takes ASA only now  Orders:    CBC and Auto Differential; Future    Comprehensive Metabolic Panel; Future    Acoustic neuroma (Multi)    Orders:    CBC and Auto Differential; Future    Comprehensive Metabolic Panel; Future    Mixed hyperlipidemia  LDL is 81, no med changes at present, patient is 84 years old  Orders:    CBC and Auto Differential; Future    Comprehensive Metabolic Panel; Future    Lipid Panel; Future    Acquired hypothyroidism  TSH is stable, no med changes  Orders:    CBC and Auto  Differential; Future    Comprehensive Metabolic Panel; Future    TSH with reflex to Free T4 if abnormal; Future    Vitamin D insufficiency  Patient takes calcium and vitamin d, she does not take any additional supplements, will add low dose vitamin d to regimen    Recheck labs in 5 months, follow up in 6 monthsOrders:    CBC and Auto Differential; Future    Comprehensive Metabolic Panel; Future    Vitamin D 25-Hydroxy,Total (for eval of Vitamin D levels); Future    cholecalciferol (Vitamin D3) 25 mcg (1,000 units) capsule; Take 1 capsule (25 mcg) by mouth once daily.    Macrocytosis  Check B 12 and folate with next blood work.   Orders:    CBC and Auto Differential; Future    Comprehensive Metabolic Panel; Future    Vitamin B12; Future    Folate; Future    Hyperglycemia  Check labs as ordered  Orders:    CBC and Auto Differential; Future    Comprehensive Metabolic Panel; Future    Hemoglobin A1C; Future     Medicare wellness exam completed, follow up in 6 months

## 2025-04-22 NOTE — ASSESSMENT & PLAN NOTE
Patient takes ASA only now  Orders:    CBC and Auto Differential; Future    Comprehensive Metabolic Panel; Future

## 2025-04-22 NOTE — ASSESSMENT & PLAN NOTE
TSH is stable, no med changes  Orders:    CBC and Auto Differential; Future    Comprehensive Metabolic Panel; Future    TSH with reflex to Free T4 if abnormal; Future

## 2025-04-30 ENCOUNTER — TELEPHONE (OUTPATIENT)
Dept: PRIMARY CARE | Facility: CLINIC | Age: 85
End: 2025-04-30
Payer: MEDICARE

## 2025-04-30 DIAGNOSIS — G25.81 RESTLESS LEG SYNDROME: ICD-10-CM

## 2025-04-30 DIAGNOSIS — E55.9 VITAMIN D INSUFFICIENCY: ICD-10-CM

## 2025-04-30 DIAGNOSIS — E03.9 ACQUIRED HYPOTHYROIDISM: ICD-10-CM

## 2025-04-30 DIAGNOSIS — I10 ESSENTIAL HYPERTENSION: ICD-10-CM

## 2025-04-30 DIAGNOSIS — E78.2 MIXED HYPERLIPIDEMIA: ICD-10-CM

## 2025-04-30 DIAGNOSIS — Z95.2 PRESENCE OF PROSTHETIC HEART VALVE: ICD-10-CM

## 2025-04-30 DIAGNOSIS — K21.9 GASTROESOPHAGEAL REFLUX DISEASE, UNSPECIFIED WHETHER ESOPHAGITIS PRESENT: ICD-10-CM

## 2025-04-30 RX ORDER — LEVOTHYROXINE SODIUM 50 UG/1
50 TABLET ORAL DAILY
Qty: 90 TABLET | Refills: 1 | Status: SHIPPED | OUTPATIENT
Start: 2025-04-30

## 2025-04-30 RX ORDER — CARVEDILOL 12.5 MG/1
12.5 TABLET ORAL
Qty: 180 TABLET | Refills: 1 | Status: SHIPPED | OUTPATIENT
Start: 2025-04-30

## 2025-04-30 RX ORDER — PANTOPRAZOLE SODIUM 40 MG/1
40 TABLET, DELAYED RELEASE ORAL DAILY
Qty: 90 TABLET | Refills: 1 | Status: SHIPPED | OUTPATIENT
Start: 2025-04-30

## 2025-04-30 RX ORDER — ROPINIROLE 0.5 MG/1
1.5 TABLET, FILM COATED ORAL NIGHTLY
Qty: 270 TABLET | Refills: 1 | Status: SHIPPED | OUTPATIENT
Start: 2025-04-30

## 2025-04-30 RX ORDER — ROSUVASTATIN CALCIUM 40 MG/1
40 TABLET, COATED ORAL DAILY
Qty: 90 TABLET | Refills: 1 | Status: SHIPPED | OUTPATIENT
Start: 2025-04-30

## 2025-04-30 NOTE — TELEPHONE ENCOUNTER
Patient states their pharmacy [facility] does not have any of their medications.   Some do not have refills but I believe they should still be able to send a sure script?  Patient says she needs them all besides the recent one.

## 2025-10-22 ENCOUNTER — APPOINTMENT (OUTPATIENT)
Dept: PRIMARY CARE | Facility: CLINIC | Age: 85
End: 2025-10-22
Payer: MEDICARE

## 2026-04-22 ENCOUNTER — APPOINTMENT (OUTPATIENT)
Dept: PRIMARY CARE | Facility: CLINIC | Age: 86
End: 2026-04-22
Payer: MEDICARE